# Patient Record
Sex: MALE
[De-identification: names, ages, dates, MRNs, and addresses within clinical notes are randomized per-mention and may not be internally consistent; named-entity substitution may affect disease eponyms.]

---

## 2024-04-23 ENCOUNTER — TRANSCRIBE ORDERS (OUTPATIENT)
Dept: OTHER | Age: 39
End: 2024-04-23

## 2024-04-23 DIAGNOSIS — R49.9 UNSPECIFIED VOICE AND RESONANCE DISORDER: Primary | ICD-10-CM

## 2024-06-06 ENCOUNTER — TELEPHONE (OUTPATIENT)
Dept: OTOLARYNGOLOGY | Facility: CLINIC | Age: 39
End: 2024-06-06
Payer: COMMERCIAL

## 2024-06-06 NOTE — TELEPHONE ENCOUNTER
Left Voicemail (1st Attempt) for the patient to call back and schedule the following:    Appointment type: New SLP Voice  Provider: Mariely Landrum  Return date: next available  Specialty phone number: [phone]  Additional appointment(s) needed:   Additonal Notes: r/s 10/4 appt

## 2024-06-10 ENCOUNTER — TELEPHONE (OUTPATIENT)
Dept: OTOLARYNGOLOGY | Facility: CLINIC | Age: 39
End: 2024-06-10
Payer: COMMERCIAL

## 2024-06-10 NOTE — TELEPHONE ENCOUNTER
Patient confirmed scheduled appointment:  Date: 11/7  Time: 3:30pm  Visit type: Return SLP Voice  Provider: Marisol Manzano  Location: [facility]  Testing/imaging:   Additional notes:

## 2024-06-11 ENCOUNTER — TELEPHONE (OUTPATIENT)
Dept: OTOLARYNGOLOGY | Facility: CLINIC | Age: 39
End: 2024-06-11
Payer: COMMERCIAL

## 2024-06-12 NOTE — TELEPHONE ENCOUNTER
FUTURE VISIT INFORMATION      FUTURE VISIT INFORMATION:  Date: 10/1/24  Time: 2:00pm  [facility]  REFERRAL INFORMATION:  Referring provider:  Ana Og APRN CNP   Referring providers clinic:  [facility]  Reason for visit/diagnosis  Unspecified voice and resonance disorder     RECORDS REQUESTED FROM:       Clinic name Comments Records Status Imaging Status   [facility] Request for recs sent 6/12- no recs coming up in Care Everywhere- sent request 6/12- received most recent records are from 2018     [facility] 2/2/17-3/19/14 Care Everywhere

## 2024-07-28 ENCOUNTER — HEALTH MAINTENANCE LETTER (OUTPATIENT)
Age: 39
End: 2024-07-28

## 2024-09-09 ENCOUNTER — TELEPHONE (OUTPATIENT)
Dept: OTOLARYNGOLOGY | Facility: CLINIC | Age: 39
End: 2024-09-09
Payer: COMMERCIAL

## 2024-09-09 NOTE — TELEPHONE ENCOUNTER
Left voicemail to offer a sooner appt for New SLP Voice with Penny Carvalho. First come first serve, please do not doublebook slot if already filled if patient calls back. At time of call had an opening on 9/12 @ 9am.   Kathryn Landis on 9/9/2024 at 9:32 AM

## 2024-09-09 NOTE — TELEPHONE ENCOUNTER
FUTURE VISIT INFORMATION      FUTURE VISIT INFORMATION:  Date: 9/12/24  Time: 9:00am  [place] [facility] [place]  REFERRAL INFORMATION:  Referring provider:  Ana Og APRN CNP   Referring providers clinic:  [facility]  Reason for visit/diagnosis  Unspecified voice and resonance disorder      RECORDS REQUESTED FROM:         Clinic name Comments Records Status Imaging Status   [facility] Request for recs sent 6/12- no recs coming up in Care Everywhere- sent request 6/12- received most recent records are from 2018       [facility] 2/2/17-3/19/14 Care Everywhere

## 2024-09-12 ENCOUNTER — PRE VISIT (OUTPATIENT)
Dept: OTOLARYNGOLOGY | Facility: CLINIC | Age: 39
End: 2024-09-12

## 2024-09-12 ENCOUNTER — OFFICE VISIT (OUTPATIENT)
Dept: OTOLARYNGOLOGY | Facility: CLINIC | Age: 39
End: 2024-09-12
Attending: NURSE PRACTITIONER
Payer: COMMERCIAL

## 2024-09-12 DIAGNOSIS — J38.7 LARYNGEAL HYPERFUNCTION: ICD-10-CM

## 2024-09-12 DIAGNOSIS — R49.9 UNSPECIFIED VOICE AND RESONANCE DISORDER: ICD-10-CM

## 2024-09-12 DIAGNOSIS — R09.A2 GLOBUS SENSATION: ICD-10-CM

## 2024-09-12 DIAGNOSIS — R49.0 DYSPHONIA: Primary | ICD-10-CM

## 2024-09-12 PROCEDURE — 92520 LARYNGEAL FUNCTION STUDIES: CPT | Mod: 52 | Performed by: SPEECH-LANGUAGE PATHOLOGIST

## 2024-09-12 PROCEDURE — 92524 BEHAVRAL QUALIT ANALYS VOICE: CPT | Mod: GN | Performed by: SPEECH-LANGUAGE PATHOLOGIST

## 2024-09-12 PROCEDURE — 31579 LARYNGOSCOPY TELESCOPIC: CPT | Performed by: SPEECH-LANGUAGE PATHOLOGIST

## 2024-09-12 NOTE — PROGRESS NOTES
[facility] VOICE [facility]  Isac Mina Jr., M.D., F.A.C.S.  Kelsey Leo M.D., M.P.H.  Irish Arguello M.D.  PEDRO Garcia, M.M. (voice), M.A., CCC-SLP  Esmer Troy MTerrenceS., CCC-SLP  Delmis Quinn, Ph.D., CCC-SLP  Naga Webber, Ph.D., CCC-SLP  LYNDSEY RodriguezS., CCC-SLP  Dylan Sanchez M.M., M.A., CCC-SLP  LISA Zacarias (voice), M.S., CCC-SLP  [facility]  INITIAL EVALUATION AND LARYNGEAL EXAMINATION REPORT    Patient: Dakota Corley  Date of Visit: 9/12/2024  Certification Dates: not needed  Referring Provider: self-referred  State of Residence: [place]  Visit Count: 1    REASON FOR REFERRAL  R49.0 (Dysphonia)/Evaluate, perform laryngeal exam, treat as appropriate    HISTORY  PATIENT INFORMATION  Dakota Corley is a 38 year old male presenting today for evaluation of dysphonia and was referred to this clinic by a friend whose wife had worked with the [facility] Voice [facility].  Salient details of his symptom history are as follows:  Chief complaint: He had previously been referred to [facility], but didn't feel like it really addressed his concerns. He feels like he doesn't have a real answer for why he's feeling strain and tightness in his voice or tools to fix it. A friend recommended that he come to the [facility]. He's had three sinus surgeries, uvulectomy, tonsillectomy, allergy shots for 5 years, and reflux treatment, but these still didn't directly address the problem.   Onset: 11-12 years ago   Course: Stable  Salient history: He has a history significant for septoplasty and turbinoplasty by Dr. Diaz in January 2015 .  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 He hasn't had any formal voice training but initially never felt any tension or strain in his  singing. He mostly sings pop, rock, jazz, blues (no opera and scream/vocal distortions). He no longer performs as much as he would like to because he doesn't trust his voice or musicality anymore. He doesn't feel like he can make his voice do what he wants it to and worries that he won't reach all of the notes in his upper range. He previously could easily switch into falsetto and effortlessly do silly voices/affectations. He now feels like his voice chokes when he tries to access the upper range. He can do some vocal health strategies and finds slight improvement, but never gets beyond what feels like a brick wall.  His speaking voice fatigues easily and feels strained/effortful. It was initially much more effortful to speak, so he developed a habit of speaking very quietly and lower which is now habitual. People have a hard time hearing him. He hesitates to speak or use his voice loudly or in loud environments because he's afraid to push or try to project. Trying to push his voice will make it fatigue even faster and might prevent him from getting through the show.   Denies pain with voice use, just lots of pressure, tension, and discomfort. It doesn't feel free to speak or sing.  He almost always feels pressure and tension along his left-side. Stress will make tension and voice quality worse.   He also plays guitar, occasionally piano  COUGH/THROAT CLEAR: Some cough, but mostly throat clearing due to drainage/mucus. He's not able to get anything out, feeling like the mucus is really gooey and thick. Some days can be worse than others, but happens frequently. Doesn't seem related to air pollution levels, foods, etc. He sleeps with a HEPA filter in his room.   SWALLOWING/GLOBUS/SENSITIVITY: Deeply regrets partial uvulectomy due to now feeling foods/liquids go into his nose. He previously felt a lump sensation all the time along the left side of his throat, which has somewhat diminished. Stress can increase the lump  sensation. He points between his left SCM and thyroid cartilage, stating it's about the size of a marble to a little bigger. He feels this internally, never having felt a lump with his finger. His partner is an NP and has also never felt a physical lump.   BREATHING: Denies.   Patient denies significant pain.     SYSTEMIC FACTORS  Hydration:  Good hydration  Stress Level: 8/10, does not have specific stress management strategies  Sleep: 6 hours/night, difficulty staying asleep and staying asleep.  Moderate MADINA with sleep apnea previously diagnosed, but no longer has issues since tonsillectomy.  Medications: testosterone, dehist supplement  Reflux: Denies overt symptoms  Post-Nasal Drip/Congestion: Chronic sinus drainage, but has had multiple sinus surgeries and uses medications.   Neck/Back/Jaw Pain and/or Tension: He has tried seeing massage therapists, who typically don't want to work in the neck area. He has not seen PT for this particular issue. Has seen PT for postural restoration and right hip. He no longer has an L4-L5 disc due to previous injury and essentially self-fusion of the vertebrae. He has more recently had issues with SI joint and tight hip flexors, having developed compensatory patterns to avoid back pain which have now caused other issues. He does have regular pain, but manages it as best he can.  OTHER PERTINENT HISTORY  Complex medical history: please also refer to  care everywhere notes      No past medical history on file.  No past surgical history on file.    OBJECTIVE FINDINGS  Patient Supplied Answers To Actual Experience Intake Voice Questionnaire       No data to display                 Patient Supplied Answers To VHI Questionnaire      9/12/2024     9:05 AM   Voice Handicap Index (VHI-10)   My voice makes it difficult for people to hear me 2   People have difficulty understanding me in a noisy room 3   My voice difficulties restrict my personal and social life.  3   I feel left out of conversations  "because of my voice 1   My voice problem causes me to lose income 4   I feel as though I have to strain to produce voice 4   The clarity of my voice is unpredictable 4   My voice problem upsets me 4   My voice makes me feel handicapped 4   People ask, \"What's wrong with your voice?\" 0   VHI-10 29        Patient Supplied Answers To CSI Questionnaire       No data to display                 Patient Supplied Answers To EAT Questionnaire       No data to display                 Patient Supplied Answers to Dyspnea Index Questionnaire:       No data to display             Speech follow up as discussed with patient:       No data to display                PERCEPTUAL EVALUATION (10669)  VOICE/ SPEECH/ NON-COMMUNICATIVE LARYNGEAL BEHAVIORS EVALUATION  Palpation of the laryngeal area shows:  firm musculature  tenderness of the thyrohyoid area  reduced thyrohoid space  L>R  bilaterally  AP palpation of the thyrohyoid area improves voice quality  AP palpation of the thyrohyoid area improves ease of inhalation  palpation did not induce cough  Breathing pattern:  shoulder and neck involvement, overall shallow breath pattern, incoordination with phonation, insufficient breath stream for duration of phonation, and is able to demonstrate abdominal breathing pattern and intercostal breathing.   Tension:  is evident in the neck and upper body  Cough/ Throat clear:  not observed today   Dakota states today is a typical voice day, with clinician observing voice quality characterized by:  Roughness: Mild to moderate  Breathiness: WNL  Strain: Mild to moderate  Habitual pitch is 98Hz and is WNL and appropriate, but sounds lower due to rough voice quality and backward focus  Loudness is mildly reduced for the setting  Maximum Phonation Time: 21 seconds  GLOBAL ASSESSMENT OF DYSPHONIA: 45/100  The GRBAS is a perceptual rating of voice change. 0 indicated no impairment, 3 indicates a severe impairment. \"C\" and \"I\" may be used to signify if " these feature was observed consistently or intermittently respectively. This is a rating based on clinical judgement of disordered voice quality.  G ( 1-2 ) General Dysphonia     R ( 1-2 ) Roughness     B ( 1 ) Breathiness     A ( 1 ) Asthenia     S ( 2 ) Strain    LARYNGEAL FUNCTION STUDIES (47023)  - 52 modifier, aerodynamic measures not collected today    /a/ mean f0 = 118.749 Hz (SD = 1.167)  /i/ mean f0 = 113.412 Hz (SD = 1.000)  [place]:     Lowest f0 = 75.907 Hz      Higest f0 = 406.994 Hz      Range = 331.088 Hz   Connected Speech     Mean f0 = 148.913 Hz (SD 50.616)     Median f0 = 139.581 Hz      Lowest f0 = 87.672 Hz      Highest f0 = 496.139 Hz      Speaking Range = 408.468 Hz         LARYNGEAL EXAMINATION (31579-with stroboscopy)  Procedure: Flexible endoscopy with chip-tip technology with stroboscopy, right nostril; topical anesthesia with 3% Lidocaine and 0.25% phenylephrine was applied.   Performed by: Dolly Zacarias (voice), M.STerrence, CCC-SLP  Verbal consent was obtained and witnessed prior to this procedure.   A time-out was performed, verifying patient, procedure, and site.     This exam shows:  Velar Function: WNL  Secretions:  mild collection of secretions in the velopharyngeal port and mild collection and banding of secretions along the glottis and superior vocal folds  Laryngeal Mucosa: slight erythema of the arytenoids, Interarytenoid tissue, and posterior cricoid region  Vocal fold mucosa:    RTVF -  very slight redness, but otherwise smooth and straight edges  LTVF -  very slight redness, but otherwise smooth and straight edges  Vocal fold function:   Vocal folds are bilaterally mobile and meet at midline, movement is brisk and symmetric, and exam is neurologically normal.  Narrow Band Imaging (NBI) demonstrated: Findings consistent with halogen light  Airway is patent as visualized below the glottis  Elongation of the vocal folds for pitch increase is inadequate  severe four-way constrictive  supraglottic hyperfunction during connected speech    The addition of stroboscopy provided the following information:  Vibratory Behavior: Present bilaterally  Periodicity: Consistently periodic  Symmetry:  good symmetry  Amplitude Right: WNL  Amplitude Left: WNL  Mucosal Wave Right: WNL  Mucosal Wave Left: WNL  Glottic closure:  on phonation glottic closure is complete   Closure Pattern: normal  Closure Plane: at glottic level  Phase Distribution: closed-phase predominant     STIMULABILITY: results of therapy probes during perceptual and laryngeal evaluation demonstrate improvement with Reduced hyperfunction with use of flow/easy onset phonation, yawn-sigh, and improved coordination of respiration and phonation.    ASSESSMENT/PLAN  IMPRESSIONS: Dakota Corley is presenting today with Globus Sensation (R09.89) and Dysphonia (R49.0) in the context of Laryngeal Hyperfunction (J38.7) and imbalance in the intrinsic and extrinsic laryngeal musculature. Perceptually, Dakota demonstrates mild-moderately rough and strained voice quality corresponding with elevated AVQI, lower than optimal habitual frequency, non optimal respiratory mechanics, significant L>R perilaryngeal tension, and visible tension of the neck/upper body. Laryngoscopy with stroboscopy was completed and reveals good symmetry and overall normal mucosal wave and amplitude bilaterally. 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 Based on today's evaluation, Dakota would benefit from a course of speech therapy to improve laryngeal efficiency, reduce laryngeal irritation (mucus), improved respiratory mechanics and coordination with phonation, manual release strategies for perilaryngeal hyperfunction, and improve voice quality and balance of intrinsic/extrinsic laryngeal musculature. We will plan on 2 in person sessions to start, combined with virtual sessions. He agreed to  drive [place] the [place] border for the virtual sessions    RECOMMENDATIONS:  Medically necessary speech therapy is warranted to improve voice quality and promote reduced discomfort, effort and fatigue.  Potentially a PT referral in combination with SLP  He demonstrates a Good prognosis for improvement given adherence to therapeutic recommendations.  Positive indicators: positive response to therapy probes diagnosis is known to respond to treatment  Negative indicators: None/Unremarkable  Research: This patient may be willing to be contacted about participation in research. Will ask at first therapy session. May be eligible for Muscle Tension Dysphonia study.    DURATION/FREQUENCY: Six bi-weekly, one-hour sessions, a mix of virtual and in person, with two monthly one-hour speech therapy follow-up sessions    Goals:  Patient goal:    To understand the problem and fix it as much as possible    Short-term goal(s): Within the first 4 sessions, Dakota will:  -- utilize vocal hygiene strategies in order to minimize laryngeal irritation and facilitate improved therapeutic outcomes with 90% accy.  -- demonstrate silent inhalation and abdominal breathing pattern in order to optimize breathing mechanics with 90% accy and min cues.  -- demonstrate daryl-laryngeal release and laryngeal massage techniques with >80% accy  -- coordinate appropriate air flow levels with forward resonance during phonation in order to minimize laryngeal compensation and effort with 90% accy.    Long-term goal(s): In 3 months, Dakota will:  -- report a 90% resolution of voice and throat symptoms during a week of performing typical personal, social, and professional activities.    This treatment plan was developed with the patient who agreed with the recommendations.    ICD-10 codes:  Globus Sensation (R09.89), Laryngeal Hyperfunction (J38.7), and Dysphonia (R49.0)    TOTAL SERVICE TIME: 120 minutes  EVALUATION OF VOICE AND RESONANCE (04122), LARYNGEAL FUNCTION  STUDIES (34597), ENDOSCOPIC LARYNGEAL EXAMINATION WITH STROBOSCOPY (64166)    Dolly Zacarias (voice), M.S., CCC-SLP  Speech-Language Pathologist  [facility]  [phone]  [email or web address]  Pronouns: she/her/hers      *this report was created in part through the use of computerized dictation software, and though reviewed following completion, some typographic errors may persist.  If there is confusion regarding any of this notes contents, please contact me for clarification

## 2024-09-17 ENCOUNTER — VIRTUAL VISIT (OUTPATIENT)
Dept: OTOLARYNGOLOGY | Facility: CLINIC | Age: 39
End: 2024-09-17
Payer: COMMERCIAL

## 2024-09-17 DIAGNOSIS — J38.7 LARYNGEAL HYPERFUNCTION: ICD-10-CM

## 2024-09-17 DIAGNOSIS — R49.0 DYSPHONIA: Primary | ICD-10-CM

## 2024-09-17 DIAGNOSIS — R09.A2 GLOBUS SENSATION: ICD-10-CM

## 2024-09-17 PROCEDURE — 92507 TX SP LANG VOICE COMM INDIV: CPT | Mod: GN | Performed by: SPEECH-LANGUAGE PATHOLOGIST

## 2024-09-17 NOTE — LETTER
9/17/2024       RE: Dakota Corley  [address] [facility] [address]     Dear Colleague,    Thank you for referring your patient, Dakota Corley, to the [facility] [place] [facility]. Please see a copy of my visit note below.    Dakota Corley is a 38 year old male who is being evaluated via a billable video visit.      Dakota has been notified and verbally consented to the following:   This video visit will be conducted between you and your provider.  Patient has opted to conduct today's video visit vs an in-person appointment.   Video visits are billed at different rates depending on your insurance coverage. Please reach out to your insurance provider with any questions.   If during the course of the call the provider feels the appointment is not appropriate, you will not be charged for this service.  Provider has received verbal consent for billable virtual visit from the patient? Yes  Will anyone else be joining your video visit? No    Call initiated at: 0900   Type of Visit Platform Used: Take[employer] Video  Location of provider: Home  Location of patient: [facility]IC  PROGRESS REPORT (CPT 72946)    Patient: Dakota Corley  Date of Service: 9/17/2024  Date of Last Service: 9/12/24  Number of Visits: 2  Certification Dates: not needed  Referring Physician:  self-referred    Impressions from evaluation on 9/12/24 by Dolly Zacarias (voice) MTerrenceSTerrence, CCC-SLP:  Dakota Corley is presenting today with Globus Sensation (R09.89) and Dysphonia (R49.0) in the context of Laryngeal Hyperfunction (J38.7) and imbalance in the intrinsic and extrinsic laryngeal musculature. Perceptually, Dakota demonstrates mild-moderately rough and strained voice quality corresponding with elevated AVQI, lower than optimal habitual frequency, non optimal respiratory mechanics, significant L>R perilaryngeal tension, and visible tension of the neck/upper body.  Laryngoscopy with stroboscopy was completed and reveals good symmetry and overall normal mucosal wave and amplitude bilaterally. 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 Based on today's evaluation, Dakota would benefit from a course of speech therapy to improve laryngeal efficiency, reduce laryngeal irritation (mucus), improved respiratory mechanics and coordination with phonation, manual release strategies for perilaryngeal hyperfunction, and improve voice quality and balance of intrinsic/extrinsic laryngeal musculature. We will plan on 2 in person sessions to start, combined with virtual sessions. He agreed to drive [place] the [place] border for the virtual sessions.     SUBJECTIVE:  Since Dakota's last session, he reports the following:   Overall he has not had any change in his symptoms, which is expected, as he has only participated in an evaluation thus far and no therapeutic suggestions were made at the time.  He notes that his baseline for normal throat sensation is skewed because he's had these issues for so long.    OBJECTIVE:  Patient Specific Goal Metrics:      9/17/2024     9:00 AM   Dysponia SLP Goals   How how severe is your [Throat Discomfort - or use patient specific description], if 0 is no [discomfort] at all and 10 is the worst [discomfort]? 6   How much does your throat discomfort bother you?     Quite a bit     THERAPEUTIC ACTIVITIES  Today Dakota participated in the following therapeutic activities:  Counseling and Education:  Asked questions about the nature of his symptoms, and I answered all of these thoroughly.  Therapy protocol and rationale, including plan for today's session and future sessions  Education of laryngeal anatomy and physiology    I provided Dakota with explanation and skilled instruction of:  Education and exercises to promote optimal respiratory mechanics were provided:  Explanation of the  "anatomy and physiology of respiration for phonation; he found this to be helpful  He demonstrated excessive upper thoracic engagement during inhalation  Difficulty allowing abdominal relaxation for inhalation, and audible inhalation prior to instruction  Targeted practice of optimal breathing mechanics with patient positioned in sitting and a forward leaning position  With clinician support, patient was able to demonstrate improved abdominal relaxation and engagement on inhalation  Optimal exhalation using inward engagement of the abdominal wall with no corresponding collapse of the upper chest cavity was trained using the sustained \"sh\" task  Discussion of releasing the posterior ribcage in order to inhale fully without constriction. We compared this to his habitual posture related to weight-lifting and how the two stances differ.  Dakota demonstrated good accuracy with moderate clinician support, including verbal explanation and visual demonstration to facilitate awareness of low respiratory engagement.   Exercises to coordinate phonation with optimal flowing airstream and reduce phonatory impact.   Semi-occluded vocal tract exercises with ballooned cheeks and fist  were most facilitating  He progressed through the warm-up level of phonatory complexity  Instructed to use as a voice warm up, cool down, coordination of breath flow with phonation, and for tissue mobilization.  Specific modifications instructed included focus on feeling his throat stretch and release  Patient demonstrated good learning, but will need practice and additional reinforcement    Instruction of Home Practice:  I instructed Dakota in the concepts of an optimal practice regimen, including use of an interval schedule with brief periods of practice frequently throughout each day, and concepts of volitional practice to facilitate motor learning.  I emphasized the therapeutic rational and provided an After Visit Summary through [facility] to reinforce " today's therapeutic activities and facilitate home practice.    ASSESSMENT/PLAN  Progress toward long-term goals:  Minimal at this point, as this is first session, but good learning today    Impressions:  IMPRESSIONS: Globus Sensation (R09.89) and Dysphonia (R49.0) in the context of Laryngeal Hyperfunction (J38.7)   Dakota had a productive session of therapy today, working on respiratory mechanics and introduction to SOVT. He will continue to work on his exercises on a daily basis, and work on incorporating the techniques into his daily activities. Speech therapy continues to be medically necessary for Dakota to participate fully and engage in activities of daily living.     Plan:   I will see Dakota in 2 weeks and will plan to check on respiratory mechanics and fist balloon. Instruct laryngeal release and neck stretches.   For practice goals, see AVS.     TOTAL SERVICE TIME: 60 minutes  TREATMENT (95103)    Dolly Zacarias (voice), M.S., CCC-SLP  Speech-Language Pathologist  [facility]  [phone]  [email or web address]  Pronouns: she/her/hers      *this report was created in part through the use of computerized dictation software, and though reviewed following completion, some typographic errors may persist.  If there is confusion regarding any of this notes contents, please contact me for clarification    OBJECTIVE FINDINGS  PATIENT REPORTED MEASURES  Patient Supplied Answers To Last 2 VHI Questionnaires      9/12/2024     9:05 AM 9/17/2024     8:39 AM   Voice Handicap Index (VHI-10)   My voice makes it difficult for people to hear me 2 2   People have difficulty understanding me in a noisy room 3 3   My voice difficulties restrict my personal and social life.  3 3   I feel left out of conversations because of my voice 1 2   My voice problem causes me to lose income 4 2   I feel as though I have to strain to produce voice 4 3   The clarity of my voice is unpredictable 4 3   My voice problem upsets  "me 4 3   My voice makes me feel handicapped 4 3   People ask, \"What's wrong with your voice?\" 0 2   VHI-10 29 26          Patient Supplied Answers To Last 2 CSI Questionnaires       No data to display                   Patient Supplied Answers To Last 2 EAT Questionnaires       No data to display                  Patient Supplied Answers to Dyspnea Index Questionnaire:       No data to display                   Again, thank you for allowing me to participate in the care of your patient.      Sincerely,    Penny Carvalho, SLP    "

## 2024-09-17 NOTE — PROGRESS NOTES
Dakota Corley is a 38 year old male who is being evaluated via a billable video visit.      Dakota has been notified and verbally consented to the following:   This video visit will be conducted between you and your provider.  Patient has opted to conduct today's video visit vs an in-person appointment.   Video visits are billed at different rates depending on your insurance coverage. Please reach out to your insurance provider with any questions.   If during the course of the call the provider feels the appointment is not appropriate, you will not be charged for this service.  Provider has received verbal consent for billable virtual visit from the patient? Yes  Will anyone else be joining your video visit? No    Call initiated at: 0900   Type of Visit Platform Used: [employer] Video  Location of provider: Home  Location of patient: [facility] VOICE CLINIC  PROGRESS REPORT (CPT 41805)    Patient: Dakota Corley  Date of Service: 9/17/2024  Date of Last Service: 9/12/24  Number of Visits: 2  Certification Dates: not needed  Referring Physician:  self-referred    Impressions from evaluation on 9/12/24 by Dolly Zacarias (voice), M.S., CCC-SLP:  Dakota Corley is presenting today with Globus Sensation (R09.89) and Dysphonia (R49.0) in the context of Laryngeal Hyperfunction (J38.7) and imbalance in the intrinsic and extrinsic laryngeal musculature. Perceptually, Dakota demonstrates mild-moderately rough and strained voice quality corresponding with elevated AVQI, lower than optimal habitual frequency, non optimal respiratory mechanics, significant L>R perilaryngeal tension, and visible tension of the neck/upper body. Laryngoscopy with stroboscopy was completed and reveals good symmetry and overall normal mucosal wave and amplitude bilaterally. 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 Based on today's evaluation, Dakota would benefit  from a course of speech therapy to improve laryngeal efficiency, reduce laryngeal irritation (mucus), improved respiratory mechanics and coordination with phonation, manual release strategies for perilaryngeal hyperfunction, and improve voice quality and balance of intrinsic/extrinsic laryngeal musculature. We will plan on 2 in person sessions to start, combined with virtual sessions. He agreed to drive [place] the [place] border for the virtual sessions.     SUBJECTIVE:  Since Dakota's last session, he reports the following:   Overall he has not had any change in his symptoms, which is expected, as he has only participated in an evaluation thus far and no therapeutic suggestions were made at the time.  He notes that his baseline for normal throat sensation is skewed because he's had these issues for so long.    OBJECTIVE:  Patient Specific Goal Metrics:      9/17/2024     9:00 AM   Dysponia SLP Goals   How how severe is your [Throat Discomfort - or use patient specific description], if 0 is no [discomfort] at all and 10 is the worst [discomfort]? 6   How much does your throat discomfort bother you?     Quite a bit     THERAPEUTIC ACTIVITIES  Today Dakota participated in the following therapeutic activities:  Counseling and Education:  Asked questions about the nature of his symptoms, and I answered all of these thoroughly.  Therapy protocol and rationale, including plan for today's session and future sessions  Education of laryngeal anatomy and physiology    I provided Dakota with explanation and skilled instruction of:  Education and exercises to promote optimal respiratory mechanics were provided:  Explanation of the anatomy and physiology of respiration for phonation; he found this to be helpful  He demonstrated excessive upper thoracic engagement during inhalation  Difficulty allowing abdominal relaxation for inhalation, and audible inhalation prior to instruction  Targeted practice of optimal breathing mechanics with  "patient positioned in sitting and a forward leaning position  With clinician support, patient was able to demonstrate improved abdominal relaxation and engagement on inhalation  Optimal exhalation using inward engagement of the abdominal wall with no corresponding collapse of the upper chest cavity was trained using the sustained \"sh\" task  Discussion of releasing the posterior ribcage in order to inhale fully without constriction. We compared this to his habitual posture related to weight-lifting and how the two stances differ.  Dakota demonstrated good accuracy with moderate clinician support, including verbal explanation and visual demonstration to facilitate awareness of low respiratory engagement.   Exercises to coordinate phonation with optimal flowing airstream and reduce phonatory impact.   Semi-occluded vocal tract exercises with ballooned cheeks and fist  were most facilitating  He progressed through the warm-up level of phonatory complexity  Instructed to use as a voice warm up, cool down, coordination of breath flow with phonation, and for tissue mobilization.  Specific modifications instructed included focus on feeling his throat stretch and release  Patient demonstrated good learning, but will need practice and additional reinforcement    Instruction of Home Practice:  I instructed Dakota in the concepts of an optimal practice regimen, including use of an interval schedule with brief periods of practice frequently throughout each day, and concepts of volitional practice to facilitate motor learning.  I emphasized the therapeutic rational and provided an After Visit Summary through [employer] to reinforce today's therapeutic activities and facilitate home practice.    ASSESSMENT/PLAN  Progress toward long-term goals:  Minimal at this point, as this is first session, but good learning today    Impressions:  IMPRESSIONS: Globus Sensation (R09.89) and Dysphonia (R49.0) in the context of Laryngeal Hyperfunction " "(J38.7)   Dakota had a productive session of therapy today, working on respiratory mechanics and introduction to SOVT. He will continue to work on his exercises on a daily basis, and work on incorporating the techniques into his daily activities. Speech therapy continues to be medically necessary for Dakota to participate fully and engage in activities of daily living.     Plan:   I will see Dakota in 2 weeks and will plan to check on respiratory mechanics and fist balloon. Instruct laryngeal release and neck stretches.   For practice goals, see AVS.     TOTAL SERVICE TIME: 60 minutes  TREATMENT (28849)    Dolly Zacarias (voice), M.S., CCC-SLP  Speech-Language Pathologist  [facility]  [phone]  [email or web address]  Pronouns: she/her/hers      *this report was created in part through the use of computerized dictation software, and though reviewed following completion, some typographic errors may persist.  If there is confusion regarding any of this notes contents, please contact me for clarification    OBJECTIVE FINDINGS  PATIENT REPORTED MEASURES  Patient Supplied Answers To Last 2 VHI Questionnaires      9/12/2024     9:05 AM 9/17/2024     8:39 AM   Voice Handicap Index (VHI-10)   My voice makes it difficult for people to hear me 2 2   People have difficulty understanding me in a noisy room 3 3   My voice difficulties restrict my personal and social life.  3 3   I feel left out of conversations because of my voice 1 2   My voice problem causes me to lose income 4 2   I feel as though I have to strain to produce voice 4 3   The clarity of my voice is unpredictable 4 3   My voice problem upsets me 4 3   My voice makes me feel handicapped 4 3   People ask, \"What's wrong with your voice?\" 0 2   VHI-10 29 26          Patient Supplied Answers To Last 2 CSI Questionnaires       No data to display                   Patient Supplied Answers To Last 2 EAT Questionnaires       No data to display          "         Patient Supplied Answers to Dyspnea Index Questionnaire:       No data to display

## 2024-09-17 NOTE — PATIENT INSTRUCTIONS
"Hello!    It was a pleasure to see you today. Please complete the exercises below and remember, a few minutes of practice many times throughout the day is more important than one large practice session. If you have any questions about your strategies, feel free to contact me at [email or web address] or via [email or web address]. Please call [phone] for scheduling alterations or to be seen sooner in case of cancellations.     Home Exercise Program:  Your goal is: Progress, not perfection.     BELLY BREATHING  Stretch: (each morning)  Stretch your arms up and take two slow, deep breaths, feeling your rib cage lift and stretch.    Now lean toward each side, taking 2 slow, deep breaths on each side, feeling your ribcage lift and expand along the sides.   Now leaning back, arching your back to feel a stretch along the front of your ribs, and take 2 more slow, deep breaths. Really let the abdomen expand fully.  Lastly, lean forward so that your head, neck, and arms are completely slack toward the ground. You can also give yourself a tight bear hug across your front.  Hold for 30 seconds and allow your back and neck to stretch and take several slow, deep breaths. You should be able to feel a bit of expansion between your spine and shoulder blades as you inhale, then feel the shoulder blades pull back inward on exhalation.      Practice: (5 breaths per hour or 10 breaths at each meal)  Sit hinged forward with your elbows on your knees. OR Sit/stand normally (give yourself permission to add 2% of a sit-up, gorilla posture)  Decide whether you prefer to place your hands on either side of your belly and back or low ribs, or one of each.  If someone is nearby to help, they can place their hands on the sides of your back and ribs.   Slowly breathe in and feel your belly and back expand, like filling a balloon, pushing out against your waistband  Slowly breathe out (on \"Shhhhhh\") and feel your belly and back crunch inward, like " "zipping tight pants  Repeat slowly and take breaks if you get dizzy    HELPFUL HINTS:  -- Tie a string or piece of elastic around your low ribs when you get dressed. You'll be able to feel yourself expand against this as you breathe all day.   -- Some people find it easiest to practice while laying on their back or in a recliner, hands on their belly.  -- You can also roll onto your stomach, resting your arms above your head, in order to feel more back expansion.   -- You can wear a weight belt when exercising and know that when you're exerting yourself, you don't expand as much in your abdomen. You'll need to keep your lower abs contracted for stability, but you can shift the focus to your solar plexus and ribs. \"Upper belly\"  -- I would not plan to wear something tight like a weight belt throughout the day, unless you're actively exercising. Something small like the elastic band for a heart-rate monitor. Remember that simply moving these joints is challenge enough to start with, and if you want more resistance, you can add a pause after the inhalation to strengthen the inspiratory muscles with controlled abdominal contraction.   ____________________________________________________    VOICE EXERCISES (AM & PM, more if needed)  -- Inhale SILENTLY and feel your belly fill with air  -- Slowly exhale as your belly contracts  -- Blow through a tight fist, making your cheeks and throat balloon  -- \"Whoooooo\"  1. 3-5x silently with just air for 5-7 seconds  2. 3-5x foghorn on single pitch for 5-7 seconds  3. 3-5x sliding lower for 5-7 seconds (\"ding dong\")  4. 3-5x sliding higher for 5-7 seconds (\"here comes the bride\")(think a little breathier when you go high)  5. 3-5x sliding up and down like sirens  8. BONUS: Easy songs/melodies with slurred phrases and no loss of balloon  -- Double-check that your inhale is a SILENT yawn and your CHEEKS ARE BALLOONED      Thank you and have a great day!  TITUS EvansMus. " (voice), M.S., CCC-SLP  Speech-Language Pathologist  [facility]  [phone]  [email or web address]  Pronouns: she/her/hers

## 2024-10-01 ENCOUNTER — PRE VISIT (OUTPATIENT)
Dept: OTOLARYNGOLOGY | Facility: CLINIC | Age: 39
End: 2024-10-01

## 2024-10-02 ENCOUNTER — VIRTUAL VISIT (OUTPATIENT)
Dept: OTOLARYNGOLOGY | Facility: CLINIC | Age: 39
End: 2024-10-02
Payer: COMMERCIAL

## 2024-10-02 DIAGNOSIS — R49.0 DYSPHONIA: Primary | ICD-10-CM

## 2024-10-02 DIAGNOSIS — R09.A2 GLOBUS SENSATION: ICD-10-CM

## 2024-10-02 DIAGNOSIS — J38.7 LARYNGEAL HYPERFUNCTION: ICD-10-CM

## 2024-10-02 PROCEDURE — 92507 TX SP LANG VOICE COMM INDIV: CPT | Mod: GN | Performed by: SPEECH-LANGUAGE PATHOLOGIST

## 2024-10-02 NOTE — Clinical Note
10/2/2024       RE: Dakota Corley  [address] [facility] [address]     Dear Colleague,    Thank you for referring your patient, Dakota Corley, to the [facility] VOICE CLIN[facility] [place] at [facility]. Please see a copy of my visit note below.    Dakota Corley is a 38 year old male who is being evaluated via a billable video visit.      Dakota has been notified and verbally consented to the following:   This video visit will be conducted between you and your provider.  Patient has opted to conduct today's video visit vs an in-person appointment.   Video visits are billed at different rates depending on your insurance coverage. Please reach out to your insurance provider with any questions.   If during the course of the call the provider feels the appointment is not appropriate, you will not be charged for this service.  Provider has received verbal consent for billable virtual visit from the patient? Yes  Will anyone else be joining your video visit? No    Call initiated at: 1200   Type of Visit Platform Used: [employer] Video  Location of provider: Home  Location of patient: [facility] VOICE CLINIC  PROGRESS REPORT (CPT 27500)    Patient: Dakota Corley  Date of Service: 10/2/2024  Date of Last Service: 9/17/24  Number of Visits: 3  Certification Dates: not needed  Referring Physician: self-referred    Impressions from evaluation on 9/12/24 by Dolly Zacarias (voice) MTerrenceSTerrence, CCC-SLP:  Dakota Corley is presenting today with Globus Sensation (R09.89) and Dysphonia (R49.0) in the context of Laryngeal Hyperfunction (J38.7) and imbalance in the intrinsic and extrinsic laryngeal musculature. Perceptually, Dakota demonstrates mild-moderately rough and strained voice quality corresponding with elevated AVQI, lower than optimal habitual frequency, non optimal respiratory mechanics, significant L>R perilaryngeal tension, and visible tension of the neck/upper body.  Laryngoscopy with stroboscopy was completed and reveals good symmetry and overall normal mucosal wave and amplitude bilaterally. 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 Based on today's evaluation, Dakota would benefit from a course of speech therapy to improve laryngeal efficiency, reduce laryngeal irritation (mucus), improved respiratory mechanics and coordination with phonation, manual release strategies for perilaryngeal hyperfunction, and improve voice quality and balance of intrinsic/extrinsic laryngeal musculature. We will plan on 2 in person sessions to start, combined with virtual sessions. He [place] drive [place] the [place] border for the virtual sessions.       SUBJECTIVE:  Since Dakota's last session, he reports the following:   ***    OBJECTIVE:  Patient Specific Goal Metrics:      9/17/2024     9:00 AM   Dysponia SLP Goals   How how severe is your [Throat Discomfort - or use patient specific description], if 0 is no [discomfort] at all and 10 is the worst [discomfort]? 6   How much does your throat discomfort bother you?     Quite a bit       THERAPEUTIC ACTIVITIES  Today Dakota participated in the following therapeutic activities:  Counseling and Education:  Asked *** questions about the nature of his symptoms, and I answered all of these thoroughly.    I provided Dakota with explanation and skilled instruction of:  ***    Instruction of Home Practice:  A revised regimen for home practice was instructed.  I provided an AVS of important notes of today's therapeutic activities to facilitate practice.    ASSESSMENT/PLAN  Progress toward long-term goals:  {[facility] Treatment Progress:656235}    Impressions:  IMPRESSIONS: Globus Sensation (R09.89) and Dysphonia (R49.0) in the context of Laryngeal Hyperfunction (J38.7)    Dakota had a productive session of therapy today, working on ***.  He will continue to work on his exercises  "on a daily basis, and work on incorporating the techniques into his daily activities. Speech therapy continues to be medically necessary for Dakota to participate fully and engage in activities of daily living.     Plan:   I will see Dakota in *** weeks and will plan to ***. check on respiratory mechanics and fist balloon. Instruct laryngeal release and neck stretches.     For home practice goals, see AVS.     TOTAL SERVICE TIME: ***60 minutes  TREATMENT (02918)    Dolly Zacarias (voice), M.S., CCC-SLP  Speech-Language Pathologist  [facility]  [phone]  [email or web address]  Pronouns: she/her/hers      *this report was created in part through the use of computerized dictation software, and though reviewed following completion, some typographic errors may persist.  If there is confusion regarding any of this notes contents, please contact me for clarification    OBJECTIVE FINDINGS  PATIENT REPORTED MEASURES  Patient Supplied Answers To Last 2 VHI Questionnaires      9/12/2024     9:05 AM 9/17/2024     8:39 AM   Voice Handicap Index (VHI-10)   My voice makes it difficult for people to hear me 2 2   People have difficulty understanding me in a noisy room 3 3   My voice difficulties restrict my personal and social life.  3 3   I feel left out of conversations because of my voice 1 2   My voice problem causes me to lose income 4 2   I feel as though I have to strain to produce voice 4 3   The clarity of my voice is unpredictable 4 3   My voice problem upsets me 4 3   My voice makes me feel handicapped 4 3   People ask, \"What's wrong with your voice?\" 0 2   VHI-10 29 26          Patient Supplied Answers To Last 2 CSI Questionnaires       No data to display                   Patient Supplied Answers To Last 2 EAT Questionnaires       No data to display                  Patient Supplied Answers to Dyspnea Index Questionnaire:       No data to display                   Again, thank you for allowing me to " participate in the care of your patient.      Sincerely,    Penny Carvalho, SLP

## 2024-10-02 NOTE — PATIENT INSTRUCTIONS
"Hello!     It was a pleasure to see you today. Please complete the exercises below and remember, a few minutes of practice many times throughout the day is more important than one large practice session. If you have any questions about your strategies, feel free to contact me at [email or web address] or via [employer]. Please call [phone] for scheduling alterations or to be seen sooner in case of cancellations.      Home Exercise Program:  Your goal is: Progress, not perfection.     LARYNGEAL RELEASE  Learning Tasks (only as needed)  1. Place your finger tips along the center of your throat/Sridhar's apple  2. Swallow and notice your voice box move up and back down  3. Compare a yawn, high-pitch EE (voice box goes up slightly) with a silent, yawny sigh on \"Huh\" like the word \"hug\" (voice box goes down slightly), gentle baby gorilla \"huh huh huh huh huh\", donkey noise \"ee uh ee uh ee uh\"     EXERCISES (5 breath/hour)   1. Drop your mouth OPEN like the word \"bleh\" with your tongue relaxed forward and flat.   2. Slowly inhale with a SILENT, yawny breath, feeling cool and quiet air flow in across your tongue and far down the back of your throat.   3. See if you sridhar's apple drops ever so slightly as you inhale with your SILENT uh feeling.   4. Repeat slowly 5 times in a row (slowly so you don't get dizzy)   -- Please DO NOT use your tongue to push down!    ADDING VOICE:   Start by tilting your head slightly back as if you're sleeping in a car, letting your jaw and tongue go totally slack (imagine a little drool coming out the side). Use your hands at the base of your neck to gently add a downward pull. Or, you could lightly rest one hand along your chin to keep your jaw relaxed.     1. Now inhale with your OPEN, SILENT yawn  2. Add a voiced sigh on \"Huhhhh\" as you exhale through the same open mouth and open throat.   -- Use a mirror if your mouth wants to close on the sigh  -- Add a real yawn to the sigh if you " "need help keeping your throat open and relaxed.    -- Place your hands on your chest and feel deep reverbation of your voice    -- Target the same level of ease and flow as your inhale- no more than 2 mph   -- Go slower if you get dizzy, with breaks between each vowel  3. Now do the following sighs 2x each: Huhhhh, Hooooo, Hohhh, Haaahhh, Heeeyyyy, Heeee, Hiiiiii, Hoooowww   -- Keep the \"UH\" shape in your throat, letting the tongue and lips make the vowel.   -- Your jaw is NOT invited.  Keep monitoring your gas pedal- no \"faster\" than 2 mph- gentle and floaty (no pushing!)  ____________________________________________________     BELLY BREATHING  Stretch: (each morning)  Stretch your arms up and take two slow, deep breaths, feeling your rib cage lift and stretch.    Now lean toward each side, taking 2 slow, deep breaths on each side, feeling your ribcage lift and expand along the sides.   Now leaning back, arching your back to feel a stretch along the front of your ribs, and take 2 more slow, deep breaths. Really let the abdomen expand fully.  Lastly, lean forward so that your head, neck, and arms are completely slack toward the ground. You can also give yourself a tight bear hug across your front.  Hold for 30 seconds and allow your back and neck to stretch and take several slow, deep breaths. You should be able to feel a bit of expansion between your spine and shoulder blades as you inhale, then feel the shoulder blades pull back inward on exhalation.       Practice: (5 breaths per hour or 10 breaths at each meal)  Sit hinged forward with your elbows on your knees. OR Sit/stand normally (give yourself permission to add 2% of a sit-up, gorilla posture)  Decide whether you prefer to place your hands on either side of your belly and back or low ribs, or one of each.  If someone is nearby to help, they can place their hands on the sides of your back and ribs.   Slowly breathe in and feel your belly and back expand, " "like filling a balloon, pushing out against your waistband  Slowly breathe out (on \"Shhhhhh\") and feel your belly and back crunch inward, like zipping tight pants  Repeat slowly and take breaks if you get dizzy     HELPFUL HINTS:  -- Tie a string or piece of elastic around your low ribs when you get dressed. You'll be able to feel yourself expand against this as you breathe all day.   -- Some people find it easiest to practice while laying on their back or in a recliner, hands on their belly.  -- You can also roll onto your stomach, resting your arms above your head, in order to feel more back expansion.   -- You can wear a weight belt when exercising and know that when you're exerting yourself, you don't expand as much in your abdomen. You'll need to keep your lower abs contracted for stability, but you can shift the focus to your solar plexus and ribs. \"Upper belly\"  -- I would not plan to wear something tight like a weight belt throughout the day, unless you're actively exercising. Something small like the elastic band for a heart-rate monitor. Remember that simply moving these joints is challenge enough to start with, and if you want more resistance, you can add a pause after the inhalation to strengthen the inspiratory muscles with controlled abdominal contraction.   ____________________________________________________     VOICE EXERCISES (AM & PM, more if needed)  -- Inhale SILENTLY with your LOW UH release and feel your belly fill with air  -- Slowly exhale as your belly contracts  -- Blow through a tight fist, making your cheeks and throat balloon  -- \"Whoooooo\" with UH in your throat.   1. 3-5x silently with just air for 5-7 seconds  2. 3-5x foghorn on single pitch for 5-7 seconds  3. 3-5x sliding lower for 5-7 seconds (\"ding dong\")  4. 3-5x sliding higher for 5-7 seconds (\"here comes the bride\")(think a little breathier when you go high)  5. 3-5x sliding up and down like sirens  8. BONUS: Easy " "songs/melodies with slurred phrases and no loss of balloon  -- Double-check that your inhale is a SILENT yawn and your CHEEKS ARE BALLOONED     ____________________________________________________    Neck Muscle Release:   (Do this first thing in the morning, and any time your neck muscles start to feel strained or tired or sore)    1. Front of the neck: Tilt your head back, letting your chin stretch toward the ceiling. Avoid clenching your teeth, but keep your mouth closed.   -- If you want a bit of extra stretch, you may jut your chin forward slightly.   -- For some additional stretch, place your hands flat at the base of your neck- stick like a tree frog and add a downward pull.   -- You can also imagine a string attached to the corner of your jaw, lifting from one side or the other, depending on what feels best to you.   -- If you want to direct the stretch a little more specifically, you can use two finger tips to apply opposite direction to \"tree frog\" stretch. Avoid the carotid artery- if you feel a strong pulse- move your fingers!    2. Front/Side of neck : Tilt your left ear toward your left shoulder, then stick your right arm behind the small of your back. Hold for 30-60 seconds.  -- You can then keep your head tilted and rotate your chin toward where your right ear was in space. You can play with the particular angle of your chin to find the best stretch. You can finger massage with your left hand. Hold for 30-60 seconds.  -- Then keep your head tilted, but rotate the other direction to look down at your left arm pit. If you'd like a little extra stretch, you can rest the weight of your left hand on the back of your skull (do not pull!). Hold for 30-60 seconds.    -- Don't forget to switch sides and do both rotation directions.    3. Sternocleidomastoid Massage: To find this muscle, turn your head to the side and feel the muscle sticking out from the bump behind your ear down to the clavicle and sternum. " Then look straight forward and use the flat parts of your thumb and pointer to gently pinch and knead/massage your SCM.   -- Add some gentle voicing while monitoring the SCM and keeping it relaxed.   -- Only do one side at a time! If you feel a strong pulse, move your fingers!    4. Back of the neck: Use one hand at a time to gently grasp the back of your neck like a puppy being picked up. Place your other hand flat at the base of your neck, stickling like a tree frog with a downward drag.   --Pinch and pull, adding a directional pull in whatever direction feels best.   -- Switch hands and repeat.   Sit up tall, then slightly tuck your chin as if looking at your top button. You should feel a slight along the base of your skull/neck.   -- Lace your fingers together and rest the weight of your hands/arm on the back of your head. Don't pull, but just use the weight to add some extra stretch. You should feel the stretch all the way down your spine. Imagine sending your air to the tight areas as you inhale, then feel the tightness release just a little more as you exhale.      For your back/shoulders, some people find it very helpful to put a tennis ball in a long sock and tie a knot at the end to keep the ball there. You can then easily hold the knee end of the sock in your hand and toss the ball over your shoulder, leaning up against a door or wall to massage your back.   -- This can also be very nice with two smaller balls (racketballs) in a sock. Lay on the floor and place the two balls along the base of your skull. Nodding and side-to-side can massage really nicely.     Add heat or ice accordingly if needed. Remember that heat and inflammation are friends, so you typically want to follow-up with cold.    Thank you and have a great day!  Avis Evans. (voice) M.S., CCC-SLP  Speech-Language Pathologist  [facility]  [phone]  [email or web address]  Pronouns: she/her/hers

## 2024-10-02 NOTE — PROGRESS NOTES
Dakota Corley is a 38 year old male who is being evaluated via a billable video visit.      Dakota has been notified and verbally consented to the following:   This video visit will be conducted between you and your provider.  Patient has opted to conduct today's video visit vs an in-person appointment.   Video visits are billed at different rates depending on your insurance coverage. Please reach out to your insurance provider with any questions.   If during the course of the call the provider feels the appointment is not appropriate, you will not be charged for this service.  Provider has received verbal consent for billable virtual visit from the patient? Yes  Will anyone else be joining your video visit? No    Call initiated at: 1200   Type of Visit Platform Used: [employer] Video  Location of provider: Home  Location of patient: [facility] VOICE CLINIC  PROGRESS REPORT (CPT 38418)    Patient: Dakota Corley  Date of Service: 10/2/2024  Date of Last Service: 9/17/24  Number of Visits: 3  Certification Dates: not needed  Referring Physician: self-referred    Impressions from evaluation on 9/12/24 by Dolly Zacarias (voice), M.S., CCC-SLP:  Dakota Corley is presenting today with Globus Sensation (R09.89) and Dysphonia (R49.0) in the context of Laryngeal Hyperfunction (J38.7) and imbalance in the intrinsic and extrinsic laryngeal musculature. Perceptually, Dakota demonstrates mild-moderately rough and strained voice quality corresponding with elevated AVQI, lower than optimal habitual frequency, non optimal respiratory mechanics, significant L>R perilaryngeal tension, and visible tension of the neck/upper body. Laryngoscopy with stroboscopy was completed and reveals good symmetry and overall normal mucosal wave and amplitude bilaterally. 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 Based on today's evaluation, Dakota would benefit  from a course of speech therapy to improve laryngeal efficiency, reduce laryngeal irritation (mucus), improved respiratory mechanics and coordination with phonation, manual release strategies for perilaryngeal hyperfunction, and improve voice quality and balance of intrinsic/extrinsic laryngeal musculature. We will plan on 2 in person sessions to start, combined with virtual sessions. He [place] to drive [place] the [place] border for the virtual sessions.     SUBJECTIVE:  Since Dakota's last session, he reports the following:   He's seen PT a couple of times and worked a lot on SCM, scalenes, but the right side is particularly tight and tender.   Some days he thinks the SOVT tasks feel better and some days they feel worse afterward.   Increasing his posterior breathing in the ribs has seemed pretty easy, though it's harder when he's also holding a guitar and singing. He knows he needs to practice singing in isolation at home more.  He continues to hit a wall when trying to sing in his upper register.  His throat always feels constricted and restricted.     OBJECTIVE:  Patient Specific Goal Metrics:      9/17/2024     9:00 AM 10/2/2024    12:00 PM   Dysponia SLP Goals   How how severe is your [Throat Discomfort - or use patient specific description], if 0 is no [discomfort] at all and 10 is the worst [discomfort]? 6 7   How much does your throat discomfort bother you?     Quite a bit Quite a bit       THERAPEUTIC ACTIVITIES  Today Dakota participated in the following therapeutic activities:  Counseling and Education:  Asked questions about the nature of his symptoms, and I answered all of these thoroughly.    I provided Dakota with explanation and skilled instruction of:  Laryngeal release technique targeting reduced laryngeal elevation and constriction and improved vocal fold aBduction was instructed  Patient was instructed through negative practice of laryngeal elevation with high-frequency, closed vowel productions and  "low-frequency, open vowel productions in an effort to develop somato-sensory awareness of the laryngeal musculature  This was combined with aBductory breathing maneuvers  Patient was instructed to utilize a silent, yawny, \"uh\"-shaped inhalation with application to all other therapy tasks  He demonstrated adequate accuracy following moderate clinician support  Instruction of exercises for gentle release of hyperfunctional neck musculature and connective tissue during phonation.  Patient demonstrates increased upper body tension, especially in the neck  good self-awareness while completing stretches and gentle self-massage along the anterior, anterior-lateral, posterior-lateral, and posterior neck.  Improvement in neck mobility and voice quality following exercises    Instruction of Home Practice:  A revised regimen for home practice was instructed.  I provided an AVS of important notes of today's therapeutic activities to facilitate practice.    ASSESSMENT/PLAN  Progress toward long-term goals:  Adequate but incomplete progress; please see above    Impressions:  IMPRESSIONS: Globus Sensation (R09.89) and Dysphonia (R49.0) in the context of Laryngeal Hyperfunction (J38.7)    Dakota had a productive session of therapy today, working on laryngeal release and neck stretches.  He will continue to work on his exercises on a daily basis, and work on incorporating the techniques into his daily activities. Speech therapy continues to be medically necessary for Dakota to participate fully and engage in activities of daily living.     Plan:   I will see Dakota in 2 weeks and will plan to check on respiratory mechanics with laryngeal release and fist balloon. Provide manual release in combination with anchor voice, progressing as able. Instruct lateral/AP release, anterior release, and jaw releases.     For home practice goals, see AVS.     TOTAL SERVICE TIME: 55 minutes  TREATMENT (40285)    Avis Zacarias. (voice), M.S., " "[facility]-SLP  Speech-Language Pathologist  [facility]  [phone]  [email or web address]  Pronouns: she/her/hers      *this report was created in part through the use of computerized dictation software, and though reviewed following completion, some typographic errors may persist.  If there is confusion regarding any of this notes contents, please contact me for clarification    OBJECTIVE FINDINGS  PATIENT REPORTED MEASURES  Patient Supplied Answers To Last 2 VHI Questionnaires      9/17/2024     8:39 AM 10/2/2024    11:44 AM   Voice Handicap Index (VHI-10)   My voice makes it difficult for people to hear me 2 2   People have difficulty understanding me in a noisy room 3 3   My voice difficulties restrict my personal and social life.  3 2   I feel left out of conversations because of my voice 2 1   My voice problem causes me to lose income 2 2   I feel as though I have to strain to produce voice 3 4   The clarity of my voice is unpredictable 3 4   My voice problem upsets me 3 4   My voice makes me feel handicapped 3 4   People ask, \"What's wrong with your voice?\" 2 2   VHI-10 26 28          Patient Supplied Answers To Last 2 CSI Questionnaires       No data to display                   Patient Supplied Answers To Last 2 EAT Questionnaires       No data to display                  Patient Supplied Answers to Dyspnea Index Questionnaire:       No data to display               "

## 2024-10-17 ENCOUNTER — OFFICE VISIT (OUTPATIENT)
Dept: OTOLARYNGOLOGY | Facility: CLINIC | Age: 39
End: 2024-10-17
Payer: COMMERCIAL

## 2024-10-17 DIAGNOSIS — R09.A2 GLOBUS SENSATION: ICD-10-CM

## 2024-10-17 DIAGNOSIS — J38.7 LARYNGEAL HYPERFUNCTION: ICD-10-CM

## 2024-10-17 DIAGNOSIS — R49.0 DYSPHONIA: Primary | ICD-10-CM

## 2024-10-17 PROCEDURE — 92507 TX SP LANG VOICE COMM INDIV: CPT | Mod: GN | Performed by: SPEECH-LANGUAGE PATHOLOGIST

## 2024-10-17 NOTE — LETTER
10/17/2024       RE: Dakota Corley  [address] [place] [address]     Dear Colleague,    Thank you for referring your patient, Dakota Corley, to the [facility] VOICE CLINIC [place] at [facility]. Please see a copy of my visit note below.    [facility] VOICE CLINIC  PROGRESS REPORT (CPT [id number])    Patient: Dakota Corley  Date of Service: 10/17/2024  Date of Last Service: 10/2/24  Number of Visits: 4  Certification Dates: not needed  Referring Physician: self-referred  We were joined by my colleague, Ian Acevedo, PhD, CFY-SLP    Impressions from evaluation on 9/12/24 by Dolly Zacarias (voice), M.S., CCC-SLP:  Dakota Corley is presenting today with Globus Sensation (R09.89) and Dysphonia (R49.0) in the context of Laryngeal Hyperfunction (J38.7) and imbalance in the intrinsic and extrinsic laryngeal musculature. Perceptually, Dakota demonstrates mild-moderately rough and strained voice quality corresponding with elevated AVQI, lower than optimal habitual frequency, non optimal respiratory mechanics, significant L>R perilaryngeal tension, and visible tension of the neck/upper body. Laryngoscopy with stroboscopy was completed and reveals good symmetry and overall normal mucosal wave and amplitude bilaterally. 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 Based on today's evaluation, Dakota would benefit from a course of speech therapy to improve laryngeal efficiency, reduce laryngeal irritation (mucus), improved respiratory mechanics and coordination with phonation, manual release strategies for perilaryngeal hyperfunction, and improve voice quality and balance of intrinsic/extrinsic laryngeal musculature. We will plan on 2 in person sessions to start, combined with virtual sessions. He [place] drive [place] the [place] border for the virtual sessions.    SUBJECTIVE:  Since  Dakota's last session, he reports the following:   He got a really bad stomach bug about 2 weeks ago, which caused a lot more mucus and throat tightness. He's just starting to feel better.   He had (prior to getting sick) been noticing a bit more tension in his throat, both while speaking and singing and at rest, wondering if he's doing something wrong or having a reaction to the treatments.   His throat discomfort today is at 5-6/10.    OBJECTIVE:  Patient Specific Goal Metrics:      9/17/2024     9:00 AM 10/2/2024    12:00 PM   Dysponia SLP Goals   How how severe is your [Throat Discomfort - or use patient specific description], if 0 is no [discomfort] at all and 10 is the worst [discomfort]? 6 7   How much does your throat discomfort bother you?     Quite a bit Quite a bit       THERAPEUTIC ACTIVITIES  Today Dakota participated in the following therapeutic activities:  Counseling and Education:  Asked questions about the nature of his symptoms, and I answered all of these thoroughly.    I provided Dakota with explanation and skilled instruction of:  Manual laryngeal massage was performed:  Patient endorsed significant tension throughout the neck and upper body.   Of note, he endorsed more tension/discomfort along the left anterior neck, but demonstrated greater right-sided contriction during manual examination today.  He underwent gentle manual release along the sternocleidomastoids, scalenes, trapezius, mylohyoid/anterior digastric, stylohyoid/posterior digastric, sternohyoid/omohyoid, and thyrohyoid space.   He endorsed feeling that the lateral and anterior laryngeal releases were particularly helpful, stating that his throat and voice felt more open and free.   He was instructed to target a change in head and neck alignment, with a longer spine and chin slightly lowered chin; He was also instructed in a gentle self-massage technique targeting the L>R SCMs.   Introduction to phonation using yawn-sigh technique was  targeted in combination with a gentle release of the perilaryngeal musculature.  Patient demonstrates a habitual tendency to hyper-activate perilaryngeal musculature with onset of phonation or inhalation prior to phonation.  He improves with a quiet inhalation through an open mouth, resulting in a release of the supralaryngeal musculature  Gentle sighs on exhalation were encouraged at a much lower intensity level, using tactile feedback to monitor for hyperactivation of the perilaryngeal musculature.  This was also applied with a head tilted back posture, targeting a release of the anterior neck musculature in combination with phonation.     Instruction of Home Practice:  A revised regimen for home practice was instructed.  I provided an AVS of important notes of today's therapeutic activities to facilitate practice.    ASSESSMENT/PLAN  Progress toward long-term goals:  Adequate but incomplete progress; please see above    Impressions:  IMPRESSIONS: Globus Sensation (R09.89) and Dysphonia (R49.0) in the context of Laryngeal Hyperfunction (J38.7)    Dakota had a productive session of therapy today, working on manual release strategies in the context of phonation/singing.  He will continue to work on his exercises on a daily basis, and work on incorporating the techniques into his daily activities. Speech therapy continues to be medically necessary for Dakota to participate fully and engage in activities of daily living.     Plan:   I will see Dakota in 2 weeks and will plan to check on manual release of R>L tension, monitoring of L SCM with phonation, and head tilt back. Introduce anchor voice in combination and progress as able.     For home practice goals, see AVS.     TOTAL SERVICE TIME: 90 minutes  TREATMENT (00664)    Dolly Zacarias (voice) MTerrenceS., CCC-SLP  Speech-Language Pathologist  [facility] Voice [facility]  [phone]  [email or web address]  Pronouns: she/her/hers      *this report was created in part  "through the use of computerized dictation software, and though reviewed following completion, some typographic errors may persist.  If there is confusion regarding any of this notes contents, please contact me for clarification    OBJECTIVE FINDINGS  PATIENT REPORTED MEASURES  Patient Supplied Answers To Last 2 VHI Questionnaires      10/2/2024    11:44 AM 10/16/2024    12:57 PM   Voice Handicap Index (VHI-10)   My voice makes it difficult for people to hear me 2 2   People have difficulty understanding me in a noisy room 3 3   My voice difficulties restrict my personal and social life.  2 2   I feel left out of conversations because of my voice 1 2   My voice problem causes me to lose income 2 3   I feel as though I have to strain to produce voice 4 3   The clarity of my voice is unpredictable 4 3   My voice problem upsets me 4 4   My voice makes me feel handicapped 4 4   People ask, \"What's wrong with your voice?\" 2 2   VHI-10 28 28          Patient Supplied Answers To Last 2 CSI Questionnaires      10/16/2024    12:57 PM   Cough Severity Index (CSI)   My cough is worse when I lie down 1   My coughing problem causes me to restrict my personal and social life 0   I tend to avoid places because of my cough problem 0   I feel embarrassed because of my coughing problem 0   People ask, ''What's wrong?'' because I cough a lot 0   I run out of air when I cough 0   My coughing problem affects my voice 0   My coughing problem limits my physical activity 0   My coughing problem upsets me 0   People ask me if I am sick because I cough a lot 0   CSI Score 1          Patient Supplied Answers To Last 2 EAT Questionnaires      10/16/2024    12:57 PM   Eating Assessment Tool (EAT-10)   My swallowing problem has caused me to lose weight 0   My swallowing problem interferes with my ability to go out for meals 0   Swallowing liquids takes extra effort 0   Swallowing solids takes extra effort 1   Swallowing pills takes extra effort 0 "   Swallowing is painful 1   The pleasure of eating is affected by my swallowing 1   When I swallow food sticks in my throat 1   I cough when I eat 0   Swallowing is stressful 1   EAT-10 5         Patient Supplied Answers to Dyspnea Index Questionnaire:       No data to display                 Again, thank you for allowing me to participate in the care of your patient.      Sincerely,    Penny Carvalho, SLP

## 2024-10-17 NOTE — PROGRESS NOTES
[facility] VOICE CLINIC  PROGRESS REPORT (CPT [id number])    Patient: Dakota Corley  Date of Service: 10/17/2024  Date of Last Service: 10/2/24  Number of Visits: 4  Certification Dates: not needed  Referring Physician: self-referred  We were joined by my colleague, Ian Acevedo, PhD, CFY-SLP    Impressions from evaluation on 9/12/24 by Dolly Zacarias (voice), M.S., CCC-SLP:  Dakota Corley is presenting today with Globus Sensation (R09.89) and Dysphonia (R49.0) in the context of Laryngeal Hyperfunction (J38.7) and imbalance in the intrinsic and extrinsic laryngeal musculature. Perceptually, Dakota demonstrates mild-moderately rough and strained voice quality corresponding with elevated AVQI, lower than optimal habitual frequency, non optimal respiratory mechanics, significant L>R perilaryngeal tension, and visible tension of the neck/upper body. Laryngoscopy with stroboscopy was completed and reveals good symmetry and overall normal mucosal wave and amplitude bilaterally. 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 Based on today's evaluation, Dakota would benefit from a course of speech therapy to improve laryngeal efficiency, reduce laryngeal irritation (mucus), improved respiratory mechanics and coordination with phonation, manual release strategies for perilaryngeal hyperfunction, and improve voice quality and balance of intrinsic/extrinsic laryngeal musculature. We will plan on 2 in person sessions to start, combined with virtual sessions. He agreed to drive [place] the [place] border for the virtual sessions.    SUBJECTIVE:  Since Dakota's last session, he reports the following:   He got a really bad stomach bug about 2 weeks ago, which caused a lot more mucus and throat tightness. He's just starting to feel better.   He had (prior to getting sick) been noticing a bit more tension in his throat, both while speaking and singing and at rest,  wondering if he's doing something wrong or having a reaction to the treatments.   His throat discomfort today is at 5-6/10.    OBJECTIVE:  Patient Specific Goal Metrics:      9/17/2024     9:00 AM 10/2/2024    12:00 PM   Dysponia SLP Goals   How how severe is your [Throat Discomfort - or use patient specific description], if 0 is no [discomfort] at all and 10 is the worst [discomfort]? 6 7   How much does your throat discomfort bother you?     Quite a bit Quite a bit       THERAPEUTIC ACTIVITIES  Today Dakota participated in the following therapeutic activities:  Counseling and Education:  Asked questions about the nature of his symptoms, and I answered all of these thoroughly.    I provided Dakota with explanation and skilled instruction of:  Manual laryngeal massage was performed:  Patient endorsed significant tension throughout the neck and upper body.   Of note, he endorsed more tension/discomfort along the left anterior neck, but demonstrated greater right-sided contriction during manual examination today.  He underwent gentle manual release along the sternocleidomastoids, scalenes, trapezius, mylohyoid/anterior digastric, stylohyoid/posterior digastric, sternohyoid/omohyoid, and thyrohyoid space.   He endorsed feeling that the lateral and anterior laryngeal releases were particularly helpful, stating that his throat and voice felt more open and free.   He was instructed to target a change in head and neck alignment, with a longer spine and chin slightly lowered chin; He was also instructed in a gentle self-massage technique targeting the L>R SCMs.   Introduction to phonation using yawn-sigh technique was targeted in combination with a gentle release of the perilaryngeal musculature.  Patient demonstrates a habitual tendency to hyper-activate perilaryngeal musculature with onset of phonation or inhalation prior to phonation.  He improves with a quiet inhalation through an open mouth, resulting in a release of the  supralaryngeal musculature  Gentle sighs on exhalation were encouraged at a much lower intensity level, using tactile feedback to monitor for hyperactivation of the perilaryngeal musculature.  This was also applied with a head tilted back posture, targeting a release of the anterior neck musculature in combination with phonation.     Instruction of Home Practice:  A revised regimen for home practice was instructed.  I provided an AVS of important notes of today's therapeutic activities to facilitate practice.    ASSESSMENT/PLAN  Progress toward long-term goals:  Adequate but incomplete progress; please see above    Impressions:  IMPRESSIONS: Globus Sensation (R09.89) and Dysphonia (R49.0) in the context of Laryngeal Hyperfunction (J38.7)    Dakota had a productive session of therapy today, working on manual release strategies in the context of phonation/singing.  He will continue to work on his exercises on a daily basis, and work on incorporating the techniques into his daily activities. Speech therapy continues to be medically necessary for Dakota to participate fully and engage in activities of daily living.     Plan:   I will see Dakota in 2 weeks and will plan to check on manual release of R>L tension, monitoring of L SCM with phonation, and head tilt back. Introduce anchor voice in combination and progress as able.     For home practice goals, see AVS.     TOTAL SERVICE TIME: 90 minutes  TREATMENT (32824)    Dolly Zacarias (voice), M.S., CCC-SLP  Speech-Language Pathologist  [facility]  [phone]  [email or web address]  Pronouns: she/her/hers    ---I attest that the services performed briefly (the head tilted back posture combined with SOVTs) were provided by a clinical fellow with my direct supervision. I performed the majority of services today. ---    Dolly Zacarias (voice), M.S., CCC-SLP  Speech-Language Pathologist  [facility]  "Clinic  [phone]  [email or web address]  Pronouns: she/her/hers      *this report was created in part through the use of computerized dictation software, and though reviewed following completion, some typographic errors may persist.  If there is confusion regarding any of this notes contents, please contact me for clarification    OBJECTIVE FINDINGS  PATIENT REPORTED MEASURES  Patient Supplied Answers To Last 2 VHI Questionnaires      10/2/2024    11:44 AM 10/16/2024    12:57 PM   Voice Handicap Index (VHI-10)   My voice makes it difficult for people to hear me 2 2   People have difficulty understanding me in a noisy room 3 3   My voice difficulties restrict my personal and social life.  2 2   I feel left out of conversations because of my voice 1 2   My voice problem causes me to lose income 2 3   I feel as though I have to strain to produce voice 4 3   The clarity of my voice is unpredictable 4 3   My voice problem upsets me 4 4   My voice makes me feel handicapped 4 4   People ask, \"What's wrong with your voice?\" 2 2   VHI-10 28 28          Patient Supplied Answers To Last 2 CSI Questionnaires      10/16/2024    12:57 PM   Cough Severity Index (CSI)   My cough is worse when I lie down 1   My coughing problem causes me to restrict my personal and social life 0   I tend to avoid places because of my cough problem 0   I feel embarrassed because of my coughing problem 0   People ask, ''What's wrong?'' because I cough a lot 0   I run out of air when I cough 0   My coughing problem affects my voice 0   My coughing problem limits my physical activity 0   My coughing problem upsets me 0   People ask me if I am sick because I cough a lot 0   CSI Score 1          Patient Supplied Answers To Last 2 EAT Questionnaires      10/16/2024    12:57 PM   Eating Assessment Tool (EAT-10)   My swallowing problem has caused me to lose weight 0   My swallowing problem interferes with my ability to go out for meals 0 "   Swallowing liquids takes extra effort 0   Swallowing solids takes extra effort 1   Swallowing pills takes extra effort 0   Swallowing is painful 1   The pleasure of eating is affected by my swallowing 1   When I swallow food sticks in my throat 1   I cough when I eat 0   Swallowing is stressful 1   EAT-10 5         Patient Supplied Answers to Dyspnea Index Questionnaire:       No data to display

## 2024-10-17 NOTE — PATIENT INSTRUCTIONS
Hello!     It was a pleasure to see you today. Please complete the exercises below and remember, a few minutes of practice many times throughout the day is more important than one large practice session. If you have any questions about your strategies, feel free to contact me at [email or web address] or via [email or web address]. Please call [phone] for scheduling alterations or to be seen sooner in case of cancellations.      Home Exercise Program:  Your goal is: Progress, not perfection.        MANUAL RELEASE STRATEGIES:  Lateral Laryngeal Stretch:  1. Position your hands like praying, then rotate both sets of fingers toward yourself and place them along each side of your sridhar's apple (you should feel the bony cartilage under your fingers the entire time. If you feel a strong pulse, your fingers are too wide).   2. Gentle shift your sridhar's apple side-to-side, only about a centimeter in each direction.   3. Decide which direction feels easier. It's totally normal to feel a little crepitance.   4. Shift your sridhar's apple about a centimeter in the easier direction and hold, breathing slow, deep breaths for about 120 seconds. You can add just a stretch to the side or to the side and downward.   5. Shift the other direction and hold, breathing slow, deep breaths for about 120 seconds.   6. You can go back and forth, holding, several times, or just once in each direction.     Anterior Laryngeal Release  Place the flat edge of your pointer along the top of your sridhar's apple, with a slightly downward press.   Take the thumb on your other hand and use it to stick under your chin (the double-chin area) and pull in an upward and forward direction, again without actually sliding across the skin.  If you want a little extra stretch, you can also raise your chin slightly  You can also play with the angle of your thumb and finger. You tended to prefer your finger to be slightly along the right side of your sridhar's apple, with your  thumb stretching up and outward to the right. Feel free to play with the specific angle of the thumb   Hold and take slow breaths for at least 2 minutes, pausing to swallow your spit and then resetting as needed.    Right Sided Shoulder/Neck/Arm  1. Point/reach down and outward with your right arm.   2. Tilt your head toward your left shoulder.   3. You could simply hold this stretch, rotating your skull in either direction.   4. If you'd like, you can also bring your left hand up and gently stick and stretch, either upward or downward.   5. If someone wants to help, they can place flat fingers a couple inches apart and stick and stretch in opposite directions- specifically wherever there is visible tension like a guitar string under the skin.     SCM monitoring  1. Gently pinch/hold your left SCM, closer to the bottom- but not all the way to the knot along the bottom.  2. Focus on keeping this muscle released and relaxed as you inhale between phrases, and keeping it relaxed while singing/speaking.   3. You can use your fingertips to monitor this, OR watch in a mirror/reversed camera.    TILT BACK RELEASE  1. Tilt your head back, lifting your chin toward the ceiling. Keep your back straight.  2. Practice a single sung phrase, but let it be as breathy and weak as it needs to be to avoid your chin pulling downward.  3.a. If you need to, you can lower the pitches until it feels easier, then raise the pitch back up. You can also start to add more voice and volume as the muscles along the front of your neck stay more relaxed.   3.b. If you want, you can also use a liptrill to work into 3a. 6-2-4-5-4-3-2-1 starting a C4 (Middle C) and moving up by half steps to F4.  First, start with your chin straight ahead, and then lift your chin up (use falsetto/breathy voice exclusively at F4).    ____________________________________________________       LARYNGEAL RELEASE  Learning Tasks (only as needed)  1. Place your finger tips  "along the center of your throat/Sridhar's apple  2. Swallow and notice your voice box move up and back down  3. Compare a yawn, high-pitch EE (voice box goes up slightly) with a silent, yawny sigh on \"Huh\" like the word \"hug\" (voice box goes down slightly), gentle baby gorilla \"huh huh huh huh huh\", donkey noise \"ee uh ee uh ee uh\"      EXERCISES (5 breath/hour)   1. Drop your mouth OPEN like the word \"bleh\" with your tongue relaxed forward and flat.   2. Slowly inhale with a SILENT, yawny breath, feeling cool and quiet air flow in across your tongue and far down the back of your throat.   3. See if you sridhar's apple drops ever so slightly as you inhale with your SILENT uh feeling.   4. Repeat slowly 5 times in a row (slowly so you don't get dizzy)              -- Please DO NOT use your tongue to push down!     ADDING VOICE:   Start by tilting your head slightly back as if you're sleeping in a car, letting your jaw and tongue go totally slack (imagine a little drool coming out the side). Use your hands at the base of your neck to gently add a downward pull. Or, you could lightly rest one hand along your chin to keep your jaw relaxed.      1. Now inhale with your OPEN, SILENT yawn  2. Add a voiced sigh on \"Huhhhh\" as you exhale through the same open mouth and open throat.   -- Use a mirror if your mouth wants to close on the sigh  -- Add a real yawn to the sigh if you need help keeping your throat open and relaxed.               -- Place your hands on your chest and feel deep reverbation of your voice               -- Target the same level of ease and flow as your inhale- no more than 2 mph              -- Go slower if you get dizzy, with breaks between each vowel  3. Now do the following sighs 2x each: Huhhhh, Hooooo, Hohhh, Haaahhh, Heeeyyyy, Heeee, Hiiiiii, Hoooowww              -- Keep the \"UH\" shape in your throat, letting the tongue and lips make the vowel.              -- Your jaw is NOT invited.  Keep monitoring " "your gas pedal- no \"faster\" than 2 mph- gentle and floaty (no pushing!)  ____________________________________________________     BELLY BREATHING  Stretch: (each morning)  Stretch your arms up and take two slow, deep breaths, feeling your rib cage lift and stretch.    Now lean toward each side, taking 2 slow, deep breaths on each side, feeling your ribcage lift and expand along the sides.   Now leaning back, arching your back to feel a stretch along the front of your ribs, and take 2 more slow, deep breaths. Really let the abdomen expand fully.  Lastly, lean forward so that your head, neck, and arms are completely slack toward the ground. You can also give yourself a tight bear hug across your front.  Hold for 30 seconds and allow your back and neck to stretch and take several slow, deep breaths. You should be able to feel a bit of expansion between your spine and shoulder blades as you inhale, then feel the shoulder blades pull back inward on exhalation.       Practice: (5 breaths per hour or 10 breaths at each meal)  Sit hinged forward with your elbows on your knees. OR Sit/stand normally (give yourself permission to add 2% of a sit-up, gorilla posture)  Decide whether you prefer to place your hands on either side of your belly and back or low ribs, or one of each.  If someone is nearby to help, they can place their hands on the sides of your back and ribs.   Slowly breathe in and feel your belly and back expand, like filling a balloon, pushing out against your waistband  Slowly breathe out (on \"Shhhhhh\") and feel your belly and back crunch inward, like zipping tight pants  Repeat slowly and take breaks if you get dizzy     HELPFUL HINTS:  -- Tie a string or piece of elastic around your low ribs when you get dressed. You'll be able to feel yourself expand against this as you breathe all day.   -- Some people find it easiest to practice while laying on their back or in a recliner, hands on their belly.  -- You can " "also roll onto your stomach, resting your arms above your head, in order to feel more back expansion.   -- You can wear a weight belt when exercising and know that when you're exerting yourself, you don't expand as much in your abdomen. You'll need to keep your lower abs contracted for stability, but you can shift the focus to your solar plexus and ribs. \"Upper belly\"  -- I would not plan to wear something tight like a weight belt throughout the day, unless you're actively exercising. Something small like the elastic band for a heart-rate monitor. Remember that simply moving these joints is challenge enough to start with, and if you want more resistance, you can add a pause after the inhalation to strengthen the inspiratory muscles with controlled abdominal contraction.   ____________________________________________________     VOICE EXERCISES (AM & PM, more if needed)  -- Inhale SILENTLY with your LOW UH release and feel your belly fill with air  -- Slowly exhale as your belly contracts  -- Blow through a tight fist, making your cheeks and throat balloon  -- \"Whoooooo\" with UH in your throat.   1. 3-5x silently with just air for 5-7 seconds  2. 3-5x foghorn on single pitch for 5-7 seconds  3. 3-5x sliding lower for 5-7 seconds (\"ding dong\")  4. 3-5x sliding higher for 5-7 seconds (\"here comes the bride\")(think a little breathier when you go high)  5. 3-5x sliding up and down like sirens  8. BONUS: Easy songs/melodies with slurred phrases and no loss of balloon  -- Double-check that your inhale is a SILENT yawn and your CHEEKS ARE BALLOONED     ____________________________________________________      Neck Muscle Release:   (Do this first thing in the morning, and any time your neck muscles start to feel strained or tired or sore)     1. Front of the neck: Tilt your head back, letting your chin stretch toward the ceiling. Avoid clenching your teeth, but keep your mouth closed.   -- If you want a bit of extra " "stretch, you may jut your chin forward slightly.   -- For some additional stretch, place your hands flat at the base of your neck- stick like a tree frog and add a downward pull.   -- You can also imagine a string attached to the corner of your jaw, lifting from one side or the other, depending on what feels best to you.   -- If you want to direct the stretch a little more specifically, you can use two finger tips to apply opposite direction to \"tree frog\" stretch. Avoid the carotid artery- if you feel a strong pulse- move your fingers!     2. Front/Side of neck : Tilt your left ear toward your left shoulder, then stick your right arm behind the small of your back. Hold for 30-60 seconds.  -- You can then keep your head tilted and rotate your chin toward where your right ear was in space. You can play with the particular angle of your chin to find the best stretch. You can finger massage with your left hand. Hold for 30-60 seconds.  -- Then keep your head tilted, but rotate the other direction to look down at your left arm pit. If you'd like a little extra stretch, you can rest the weight of your left hand on the back of your skull (do not pull!). Hold for 30-60 seconds.     -- Don't forget to switch sides and do both rotation directions.     3. Sternocleidomastoid Massage: To find this muscle, turn your head to the side and feel the muscle sticking out from the bump behind your ear down to the clavicle and sternum. Then look straight forward and use the flat parts of your thumb and pointer to gently pinch and knead/massage your SCM.   -- Add some gentle voicing while monitoring the SCM and keeping it relaxed.   -- Only do one side at a time! If you feel a strong pulse, move your fingers!     4. Back of the neck: Use one hand at a time to gently grasp the back of your neck like a puppy being picked up. Place your other hand flat at the base of your neck, stickling like a tree frog with a downward drag.   --Pinch and " pull, adding a directional pull in whatever direction feels best.   -- Switch hands and repeat.   Sit up tall, then slightly tuck your chin as if looking at your top button. You should feel a slight along the base of your skull/neck.   -- Lace your fingers together and rest the weight of your hands/arm on the back of your head. Don't pull, but just use the weight to add some extra stretch. You should feel the stretch all the way down your spine. Imagine sending your air to the tight areas as you inhale, then feel the tightness release just a little more as you exhale.      For your back/shoulders, some people find it very helpful to put a tennis ball in a long sock and tie a knot at the end to keep the ball there. You can then easily hold the knee end of the sock in your hand and toss the ball over your shoulder, leaning up against a door or wall to massage your back.   -- This can also be very nice with two smaller balls (racketballs) in a sock. Lay on the floor and place the two balls along the base of your skull. Nodding and side-to-side can massage really nicely.      Add heat or ice accordingly if needed. Remember that heat and inflammation are friends, so you typically want to follow-up with cold.     Thank you and have a great day!  Avis Evans. (voice) M.S., CCC-SLP  Speech-Language Pathologist  [facility]  [phone]  [email or web address]  Pronouns: she/her/hers

## 2024-10-30 ENCOUNTER — VIRTUAL VISIT (OUTPATIENT)
Dept: OTOLARYNGOLOGY | Facility: CLINIC | Age: 39
End: 2024-10-30
Payer: COMMERCIAL

## 2024-10-30 DIAGNOSIS — R49.0 DYSPHONIA: Primary | ICD-10-CM

## 2024-10-30 DIAGNOSIS — R09.A2 GLOBUS SENSATION: ICD-10-CM

## 2024-10-30 DIAGNOSIS — J38.7 LARYNGEAL HYPERFUNCTION: ICD-10-CM

## 2024-10-30 PROCEDURE — 92507 TX SP LANG VOICE COMM INDIV: CPT | Mod: GN | Performed by: SPEECH-LANGUAGE PATHOLOGIST

## 2024-10-30 NOTE — PROGRESS NOTES
Dakota Corley is a 38 year old male who is being evaluated via a billable video visit.      Dakota has been notified and verbally consented to the following:   This video visit will be conducted between you and your provider.  Patient has opted to conduct today's video visit vs an in-person appointment.   Video visits are billed at different rates depending on your insurance coverage. Please reach out to your insurance provider with any questions.   If during the course of the call the provider feels the appointment is not appropriate, you will not be charged for this service.  Provider has received verbal consent for billable virtual visit from the patient? Yes  Will anyone else be joining your video visit? No    Call initiated at: 1200   Type of Visit Platform Used: [employer] Video  Location of provider: Home  Location of patient: [facility]  PROGRESS REPORT (CPT 27695)    Patient: Dakota Corley  Date of Service: 10/30/2024  Date of Last Service: 10/17/24  Number of Visits: 5  Certification Dates: not needed  Referring Physician: self-referred    Impressions from evaluation on 9/12/24 by Dolly Zacarias (voice), M.S., CCC-SLP:  Dakota Corley is presenting today with Globus Sensation (R09.89) and Dysphonia (R49.0) in the context of Laryngeal Hyperfunction (J38.7) and imbalance in the intrinsic and extrinsic laryngeal musculature. Perceptually, Dakota demonstrates mild-moderately rough and strained voice quality corresponding with elevated AVQI, lower than optimal habitual frequency, non optimal respiratory mechanics, significant L>R perilaryngeal tension, and visible tension of the neck/upper body. Laryngoscopy with stroboscopy was completed and reveals good symmetry and overall normal mucosal wave and amplitude bilaterally. 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 Based on today's evaluation, Dakota would benefit  from a course of speech therapy to improve laryngeal efficiency, reduce laryngeal irritation (mucus), improved respiratory mechanics and coordination with phonation, manual release strategies for perilaryngeal hyperfunction, and improve voice quality and balance of intrinsic/extrinsic laryngeal musculature. We will plan on 2 in person sessions to start, combined with virtual sessions. He agreed to drive across the [place] border for the virtual sessions.    SUBJECTIVE:  Since Dakota's last session, he reports the following:   The tilt back strategy with voicing has been very helpful, but also difficult due to how much tension he has.  Monitoring his SCMs during a flare-up and the lateral neck releases have been helpful.   He's noticing some improving in vocal quality both in speaking and singing.   Speaking on the phone has felt less labored and effortful.   His physical therapist noted significantly reduced SCM activity with breathing.   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 He's started to sing a little bit more in the car and around the house.   He's trying to get in with a masseuse for some soft tissue work.     OBJECTIVE:  Patient Specific Goal Metrics:      9/17/2024     9:00 AM 10/2/2024    12:00 PM 10/17/2024    10:00 AM   Dysponia SLP Goals   How how severe is your [Throat Discomfort - or use patient specific description], if 0 is no [discomfort] at all and 10 is the worst [discomfort]? 6 7 8   How much does your throat discomfort bother you?     Quite a bit Quite a bit Somewhat       THERAPEUTIC ACTIVITIES  Today Dakota participated in the following therapeutic activities:  Counseling and Education:  Asked questions about the nature of his symptoms, and I answered all of these thoroughly.  Recommendations for voice use and reinforcement of strategies for a long, upcoming road trip, specifically in regards to the tendency to push vocally over the road noise.     I provided Dakota with explanation  "and skilled instruction of:  Clarification of the anterior laryngeal release strategy, with both images to clarify the muscle targets and demonstration, with patient demonstrating improved accy and confidence.   We did discuss that not all of the strategies may end up being particularly helpful, in which case he can trial and error to determine which are his most helpful strategies to continue using.   Exercises to coordinate phonation with released laryngeal constriction and optimal, flowing airstream.   Slightly increased head tilt position, combining with the strategy instructed by my colleague, Ian Acevedo, PhD, CF-SLP at the previous session, with relaxed jaw and tongue, using hands for an \"anchor point\" at the base of the neck.   A hand on his jaw was facilitative to monitor and reinforce jaw release, with a significant tendency toward mandibular retraction.   Yawn Sigh tasks targeting increased intra-oral space and increased airflow were most facilitating  He progressed through the sigh and word level of phonatory complexity  Instructed to use as a voice warm up, cool down, coordination of breath flow with phonation, and for tissue mobilization.  Patient demonstrated good learning, but will need practice and additional reinforcement  Instruction of Home Practice:  A revised regimen for home practice was instructed.  I provided an AVS of important notes of today's therapeutic activities to facilitate practice.    ASSESSMENT/PLAN  Progress toward long-term goals:  Adequate but incomplete progress; please see above    Impressions:  IMPRESSIONS: Globus Sensation (R09.89) and Dysphonia (R49.0) in the context of Laryngeal Hyperfunction (J38.7)    Dakota had a productive session of therapy today, working on progression of yawn-sighs with anchor release and head tilt back.  He will continue to work on his exercises on a daily basis, and work on incorporating the techniques into his daily activities. Speech therapy " "continues to be medically necessary for Dakota to participate fully and engage in activities of daily living.     Plan:   I will see Dakota in 2 weeks and will plan to review the anterior/submandibular release. Add jaw release strategies, particularly with anchor voice, progressing from words as able.  Check on manual release of R>L tension, monitoring of L SCM with phonation, and head tilt back.     For home practice goals, see AVS.     TOTAL SERVICE TIME: 60 minutes  TREATMENT (31437)    Dolly Zacarias (voice), M.S., CCC-SLP  Speech-Language Pathologist  [facility]  [phone]  [email or web address]  Pronouns: she/her/hers      *this report was created in part through the use of computerized dictation software, and though reviewed following completion, some typographic errors may persist.  If there is confusion regarding any of this notes contents, please contact me for clarification    OBJECTIVE FINDINGS  PATIENT REPORTED MEASURES  Patient Supplied Answers To Last 2 VHI Questionnaires      10/16/2024    12:57 PM 10/29/2024     6:51 PM   Voice Handicap Index (VHI-10)   My voice makes it difficult for people to hear me 2  2    People have difficulty understanding me in a noisy room 3  2    My voice difficulties restrict my personal and social life.  2  2    I feel left out of conversations because of my voice 2  1    My voice problem causes me to lose income 3  2    I feel as though I have to strain to produce voice 3  2    The clarity of my voice is unpredictable 3  3    My voice problem upsets me 4  4    My voice makes me feel handicapped 4  3    People ask, \"What's wrong with your voice?\" 2  1    VHI-10 28 22        Patient-reported          Patient Supplied Answers To Last 2 CSI Questionnaires      10/16/2024    12:57 PM 10/29/2024     6:51 PM   Cough Severity Index (CSI)   My cough is worse when I lie down 1  1    My coughing problem causes me to restrict my personal and social life 0  1    I " tend to avoid places because of my cough problem 0  1    I feel embarrassed because of my coughing problem 0  1    People ask, ''What's wrong?'' because I cough a lot 0  1    I run out of air when I cough 0  1    My coughing problem affects my voice 0  1    My coughing problem limits my physical activity 0  1    My coughing problem upsets me 0  2    People ask me if I am sick because I cough a lot 0  1    CSI Score 1 11        Patient-reported          Patient Supplied Answers To Last 2 EAT Questionnaires      10/16/2024    12:57 PM   Eating Assessment Tool (EAT-10)   My swallowing problem has caused me to lose weight 0    My swallowing problem interferes with my ability to go out for meals 0    Swallowing liquids takes extra effort 0    Swallowing solids takes extra effort 1    Swallowing pills takes extra effort 0    Swallowing is painful 1    The pleasure of eating is affected by my swallowing 1    When I swallow food sticks in my throat 1    I cough when I eat 0    Swallowing is stressful 1    EAT-10 5       Patient-reported         Patient Supplied Answers to Dyspnea Index Questionnaire:       No data to display

## 2024-10-30 NOTE — PATIENT INSTRUCTIONS
Hello!     It was a pleasure to see you today. Please complete the exercises below and remember, a few minutes of practice many times throughout the day is more important than one large practice session. If you have any questions about your strategies, feel free to contact me at [email or web address] or via [employer]. Please call [phone] for scheduling alterations or to be seen sooner in case of cancellations.      Home Exercise Program:  Your goal is: Progress, not perfection.     Laney Lester with [employer], near campus.     Travel Tips:  Try to optimize your seat- forward tilt of the seat, lumbar support  Place tennis balls between your back and the seat in a sock.  Set alarms to get out and stretch.   Keep the radio off if you're talking. Aim to speak more nasally.       MANUAL RELEASE STRATEGIES:  Lateral Laryngeal Stretch:  1. Position your hands like praying, then rotate both sets of fingers toward yourself and place them along each side of your sridahr's apple (you should feel the bony cartilage under your fingers the entire time. If you feel a strong pulse, your fingers are too wide).   2. Gentle shift your sridhar's apple side-to-side, only about a centimeter in each direction.   3. Decide which direction feels easier. It's totally normal to feel a little crepitance.   4. Shift your sridhar's apple about a centimeter in the easier direction and hold, breathing slow, deep breaths for about 120 seconds. You can add just a stretch to the side or to the side and downward.   5. Shift the other direction and hold, breathing slow, deep breaths for about 120 seconds.   6. You can go back and forth, holding, several times, or just once in each direction.      Anterior Laryngeal Release  Place the flat edge of your pointer along the top of your sridhar's apple, with a slightly downward press. You can feel free to shift this finger toward each side, so that you're creating an opposite stretch from your  thumb.  Take the thumb on your other hand and use it to stick under your chin (the double-chin area) and pull in an upward and forward direction, again without actually sliding across the skin.  If you want a little extra stretch, you can also raise your chin slightly  You can also play with the angle of your thumb and finger. You tended to prefer your finger to be slightly along the right side of your sridhar's apple, with your thumb stretching up and outward to the right. Feel free to play with the specific angle of the thumb   Hold and take slow breaths for at least 2 minutes, pausing to swallow your spit and then resetting as needed.     Right Sided Shoulder/Neck/Arm  1. Point/reach down and outward with your right arm.   2. Tilt your head toward your left shoulder.   3. You could simply hold this stretch, rotating your skull in either direction.   4. If you'd like, you can also bring your left hand up and gently stick and stretch, either upward or downward.   5. If someone wants to help, they can place flat fingers a couple inches apart and stick and stretch in opposite directions- specifically wherever there is visible tension like a guitar string under the skin.      SCM monitoring  1. Gently pinch/hold your left SCM, closer to the bottom- but not all the way to the knot along the bottom.  2. Focus on keeping this muscle released and relaxed as you inhale between phrases, and keeping it relaxed while singing/speaking.   3. You can use your fingertips to monitor this, OR watch in a mirror/reversed camera.     TILT BACK RELEASE  1. Tilt your head back, lifting your chin toward the ceiling. Keep your back straight.  2. Practice a single sung phrase, but let it be as breathy and weak as it needs to be to avoid your chin pulling downward.  3.a. If you need to, you can lower the pitches until it feels easier, then raise the pitch back up. You can also start to add more voice and volume as the muscles along the front  "of your neck stay more relaxed.   3.b. If you want, you can also use a liptrill to work into 3a. -5-4-3-2-1 starting a C4 (Middle C) and moving up by half steps to F4.  First, start with your chin straight ahead, and then lift your chin up (use falsetto/breathy voice exclusively at F4).     ____________________________________________________        LARYNGEAL RELEASE  Learning Tasks (only as needed)  1. Place your finger tips along the center of your throat/Sridhar's apple  2. Swallow and notice your voice box move up and back down  3. Compare a yawn, high-pitch EE (voice box goes up slightly) with a silent, yawny sigh on \"Huh\" like the word \"hug\" (voice box goes down slightly), gentle baby gorilla \"huh huh huh huh huh\", donkey noise \"ee uh ee uh ee uh\"      EXERCISES (5 breath/hour)   1. Drop your mouth OPEN like the word \"bleh\" with your tongue relaxed forward and flat.   2. Slowly inhale with a SILENT, yawny breath, feeling cool and quiet air flow in across your tongue and far down the back of your throat.   3. See if you sridhar's apple drops ever so slightly as you inhale with your SILENT uh feeling.   4. Repeat slowly 5 times in a row (slowly so you don't get dizzy)              -- Please DO NOT use your tongue to push down!     ADDING VOICE:   ANCHOR VOICE with TILT BACK   Start by tilting your head back as if you're sleeping in a car, letting your jaw and tongue go totally slack (imagine a little drool coming out the side). Use your hands at the base of your neck to gently add a downward pull.   -- Or, you could lightly rest one hand along your chin to keep your jaw relaxed.   -- You can add a very small side-to-side head shake/wobble. If you find a spot that feels particularly tight or feels particularly good, hold that position. (Left SCM) ([employer])     1. Now inhale with your OPEN, SILENT yawn several times, feeling the air flow unobstructed down the pipe and back out. Remind your jaw and tongue " "to relax.   2. Add a voiced sigh on \"Huhhhh\" as you exhale through the same open mouth and open throat.   -- Focus on EASING into the sigh. Avoid abrupt engagement of the voice.   -- Add a real yawn to the sigh if you need help keeping your throat open and relaxed.               -- Place your hands on your chest and feel deep reverbation of your voice               -- Target the same level of ease and flow as your inhale- no more than 2 mph              -- Go slower if you get dizzy, with breaks between each vowel  3. Now do the following sighs 2x each: Huhhhh, Hooooo, Hohhh, Haaahhh, Heeeyyyy, Heeee, Hiiiiii, Hoooowww              -- Keep the \"UH\" shape in your throat, letting the tongue and lips make the vowel.              -- Your jaw is NOT invited. Keep reminding yourself to let your jaw be slack.   -- Keep the pipe open from bottom to top.   -- Think of \"onion breath\" or aeromatically assaulted  Keep monitoring your gas pedal- no \"faster\" than 2 mph- gentle and floaty (no pushing!)  4. Home, ham, hum, him, hang, hung, hill, hole, hail, heal, black, honey, hello, hammer, human, hollow, holiday, hamburger, humidity, humorous  -- All about the breath, not the voice  -- Replicate the internal and external output    ____________________________________________________     BELLY BREATHING  Stretch: (each morning)  Stretch your arms up and take two slow, deep breaths, feeling your rib cage lift and stretch.    Now lean toward each side, taking 2 slow, deep breaths on each side, feeling your ribcage lift and expand along the sides.   Now leaning back, arching your back to feel a stretch along the front of your ribs, and take 2 more slow, deep breaths. Really let the abdomen expand fully.  Lastly, lean forward so that your head, neck, and arms are completely slack toward the ground. You can also give yourself a tight bear hug across your front.  Hold for 30 seconds and allow your back and neck to stretch and take " "several slow, deep breaths. You should be able to feel a bit of expansion between your spine and shoulder blades as you inhale, then feel the shoulder blades pull back inward on exhalation.       Practice: (5 breaths per hour or 10 breaths at each meal)  Sit hinged forward with your elbows on your knees. OR Sit/stand normally (give yourself permission to add 2% of a sit-up, gorilla posture)  Decide whether you prefer to place your hands on either side of your belly and back or low ribs, or one of each.  If someone is nearby to help, they can place their hands on the sides of your back and ribs.   Slowly breathe in and feel your belly and back expand, like filling a balloon, pushing out against your waistband  Slowly breathe out (on \"Shhhhhh\") and feel your belly and back crunch inward, like zipping tight pants  Repeat slowly and take breaks if you get dizzy     HELPFUL HINTS:  -- Tie a string or piece of elastic around your low ribs when you get dressed. You'll be able to feel yourself expand against this as you breathe all day.   -- Some people find it easiest to practice while laying on their back or in a recliner, hands on their belly.  -- You can also roll onto your stomach, resting your arms above your head, in order to feel more back expansion.   -- You can wear a weight belt when exercising and know that when you're exerting yourself, you don't expand as much in your abdomen. You'll need to keep your lower abs contracted for stability, but you can shift the focus to your solar plexus and ribs. \"Upper belly\"  -- I would not plan to wear something tight like a weight belt throughout the day, unless you're actively exercising. Something small like the elastic band for a heart-rate monitor. Remember that simply moving these joints is challenge enough to start with, and if you want more resistance, you can add a pause after the inhalation to strengthen the inspiratory muscles with controlled abdominal contraction. " "  ____________________________________________________     VOICE EXERCISES (AM & PM, more if needed)  -- Inhale SILENTLY with your LOW UH release and feel your belly fill with air  -- Slowly exhale as your belly contracts  -- Blow through a tight fist, making your cheeks and throat balloon  -- \"Whoooooo\" with UH in your throat.   1. 3-5x silently with just air for 5-7 seconds  2. 3-5x foghorn on single pitch for 5-7 seconds  3. 3-5x sliding lower for 5-7 seconds (\"ding dong\")  4. 3-5x sliding higher for 5-7 seconds (\"here comes the bride\")(think a little breathier when you go high)  5. 3-5x sliding up and down like sirens  8. BONUS: Easy songs/melodies with slurred phrases and no loss of balloon  -- Double-check that your inhale is a SILENT yawn and your CHEEKS ARE BALLOONED     ____________________________________________________        Neck Muscle Release:   (Do this first thing in the morning, and any time your neck muscles start to feel strained or tired or sore)     1. Front of the neck: Tilt your head back, letting your chin stretch toward the ceiling. Avoid clenching your teeth, but keep your mouth closed.   -- If you want a bit of extra stretch, you may jut your chin forward slightly.   -- For some additional stretch, place your hands flat at the base of your neck- stick like a tree frog and add a downward pull.   -- You can also imagine a string attached to the corner of your jaw, lifting from one side or the other, depending on what feels best to you.   -- If you want to direct the stretch a little more specifically, you can use two finger tips to apply opposite direction to \"tree frog\" stretch. Avoid the carotid artery- if you feel a strong pulse- move your fingers!     2. Front/Side of neck : Tilt your left ear toward your left shoulder, then stick your right arm behind the small of your back. Hold for 30-60 seconds.  -- You can then keep your head tilted and rotate your chin toward where your right ear " was in space. You can play with the particular angle of your chin to find the best stretch. You can finger massage with your left hand. Hold for 30-60 seconds.  -- Then keep your head tilted, but rotate the other direction to look down at your left arm pit. If you'd like a little extra stretch, you can rest the weight of your left hand on the back of your skull (do not pull!). Hold for 30-60 seconds.     -- Don't forget to switch sides and do both rotation directions.     3. Sternocleidomastoid Massage: To find this muscle, turn your head to the side and feel the muscle sticking out from the bump behind your ear down to the clavicle and sternum. Then look straight forward and use the flat parts of your thumb and pointer to gently pinch and knead/massage your SCM.   -- Add some gentle voicing while monitoring the SCM and keeping it relaxed.   -- Only do one side at a time! If you feel a strong pulse, move your fingers!     4. Back of the neck: Use one hand at a time to gently grasp the back of your neck like a puppy being picked up. Place your other hand flat at the base of your neck, stickling like a tree frog with a downward drag.   --Pinch and pull, adding a directional pull in whatever direction feels best.   -- Switch hands and repeat.   Sit up tall, then slightly tuck your chin as if looking at your top button. You should feel a slight along the base of your skull/neck.   -- Lace your fingers together and rest the weight of your hands/arm on the back of your head. Don't pull, but just use the weight to add some extra stretch. You should feel the stretch all the way down your spine. Imagine sending your air to the tight areas as you inhale, then feel the tightness release just a little more as you exhale.      For your back/shoulders, some people find it very helpful to put a tennis ball in a long sock and tie a knot at the end to keep the ball there. You can then easily hold the knee end of the sock in your  hand and toss the ball over your shoulder, leaning up against a door or wall to massage your back.   -- This can also be very nice with two smaller balls (racketballs) in a sock. Lay on the floor and place the two balls along the base of your skull. Nodding and side-to-side can massage really nicely.      Add heat or ice accordingly if needed. Remember that heat and inflammation are friends, so you typically want to follow-up with cold.     Thank you and have a great day!  Avis Evans. (voice), M.S., CCC-SLP  Speech-Language Pathologist  [facility]  [phone]  [email or web address]  Pronouns: she/her/hers

## 2024-10-30 NOTE — LETTER
10/30/2024       RE: Daktoa Corlye  [address] [facility] [address]     Dear Colleague,    Thank you for referring your patient, Dakota Corley, to the [facility] [place] at [facility]. Please see a copy of my visit note below.    Dakota Corley is a 38 year old male who is being evaluated via a billable video visit.      Dakota has been notified and verbally consented to the following:   This video visit will be conducted between you and your provider.  Patient has opted to conduct today's video visit vs an in-person appointment.   Video visits are billed at different rates depending on your insurance coverage. Please reach out to your insurance provider with any questions.   If during the course of the call the provider feels the appointment is not appropriate, you will not be charged for this service.  Provider has received verbal consent for billable virtual visit from the patient? Yes  Will anyone else be joining your video visit? No    Call initiated at: 1200   Type of Visit Platform Used: [employer] Video  Location of provider: Home  Location of patient: [facility]  PROGRESS REPORT (CPT 04425)    Patient: Dakota Corley  Date of Service: 10/30/2024  Date of Last Service: 10/17/24  Number of Visits: 5  Certification Dates: not needed  Referring Physician: self-referred    Impressions from evaluation on 9/12/24 by Dolly Zacarias (voice) MTerrenceSTerrence, CCC-SLP:  Dakota Corley is presenting today with Globus Sensation (R09.89) and Dysphonia (R49.0) in the context of Laryngeal Hyperfunction (J38.7) and imbalance in the intrinsic and extrinsic laryngeal musculature. Perceptually, Dakota demonstrates mild-moderately rough and strained voice quality corresponding with elevated AVQI, lower than optimal habitual frequency, non optimal respiratory mechanics, significant L>R perilaryngeal tension, and visible tension of the neck/upper body.  Laryngoscopy with stroboscopy was completed and reveals good symmetry and overall normal mucosal wave and amplitude bilaterally. 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 Based on today's evaluation, Dakota would benefit from a course of speech therapy to improve laryngeal efficiency, reduce laryngeal irritation (mucus), improved respiratory mechanics and coordination with phonation, manual release strategies for perilaryngeal hyperfunction, and improve voice quality and balance of intrinsic/extrinsic laryngeal musculature. We will plan on 2 in person sessions to start, combined with virtual sessions. He agreed to drive across the [place] border for the virtual sessions.    SUBJECTIVE:  Since Dakota's last session, he reports the following:   The tilt back strategy with voicing has been very helpful, but also difficult due to how much tension he has.  Monitoring his SCMs during a flare-up and the lateral neck releases have been helpful.   He's noticing some improving in vocal quality both in speaking and singing.   Speaking on the phone has felt less labored and effortful.   His physical therapist noted significantly reduced SCM activity with breathing.   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 He's started to sing a little bit more in the car and around the house.   He's trying to get in with a masseuse for some soft tissue work.     OBJECTIVE:  Patient Specific Goal Metrics:      9/17/2024     9:00 AM 10/2/2024    12:00 PM 10/17/2024    10:00 AM   Dysponia SLP Goals   How how severe is your [Throat Discomfort - or use patient specific description], if 0 is no [discomfort] at all and 10 is the worst [discomfort]? 6 7 8   How much does your throat discomfort bother you?     Quite a bit Quite a bit Somewhat       THERAPEUTIC ACTIVITIES  Today Dakota participated in the following therapeutic activities:  Counseling  "and Education:  Asked questions about the nature of his symptoms, and I answered all of these thoroughly.  Recommendations for voice use and reinforcement of strategies for a long, upcoming road trip, specifically in regards to the tendency to push vocally over the road noise.     I provided Dakota with explanation and skilled instruction of:  Clarification of the anterior laryngeal release strategy, with both images to clarify the muscle targets and demonstration, with patient demonstrating improved accy and confidence.   We did discuss that not all of the strategies may end up being particularly helpful, in which case he can trial and error to determine which are his most helpful strategies to continue using.   Exercises to coordinate phonation with released laryngeal constriction and optimal, flowing airstream.   Slightly increased head tilt position, combining with the strategy instructed by my colleague, Ian Acevedo, PhD, CF-SLP at the previous session, with relaxed jaw and tongue, using hands for an \"anchor point\" at the base of the neck.   A hand on his jaw was facilitative to monitor and reinforce jaw release, with a significant tendency toward mandibular retraction.   Yawn Sigh tasks targeting increased intra-oral space and increased airflow were most facilitating  He progressed through the sigh and word level of phonatory complexity  Instructed to use as a voice warm up, cool down, coordination of breath flow with phonation, and for tissue mobilization.  Patient demonstrated good learning, but will need practice and additional reinforcement  Instruction of Home Practice:  A revised regimen for home practice was instructed.  I provided an AVS of important notes of today's therapeutic activities to facilitate practice.    ASSESSMENT/PLAN  Progress toward long-term goals:  Adequate but incomplete progress; please see above    Impressions:  IMPRESSIONS: Globus Sensation (R09.89) and Dysphonia (R49.0) in the " "context of Laryngeal Hyperfunction (J38.7)    Dakota had a productive session of therapy today, working on progression of yawn-sighs with anchor release and head tilt back.  He will continue to work on his exercises on a daily basis, and work on incorporating the techniques into his daily activities. Speech therapy continues to be medically necessary for Dakota to participate fully and engage in activities of daily living.     Plan:   I will see Dakota in 2 weeks and will plan to review the anterior/submandibular release. Add jaw release strategies, particularly with anchor voice, progressing from words as able.  Check on manual release of R>L tension, monitoring of L SCM with phonation, and head tilt back.     For home practice goals, see AVS.     TOTAL SERVICE TIME: 60 minutes  TREATMENT (60886)    Dolly Zacarias (voice), M.S., CCC-SLP  Speech-Language Pathologist  [facility]  [phone]  [email or web address]  Pronouns: she/her/hers      *this report was created in part through the use of computerized dictation software, and though reviewed following completion, some typographic errors may persist.  If there is confusion regarding any of this notes contents, please contact me for clarification    OBJECTIVE FINDINGS  PATIENT REPORTED MEASURES  Patient Supplied Answers To Last 2 VHI Questionnaires      10/16/2024    12:57 PM 10/29/2024     6:51 PM   Voice Handicap Index (VHI-10)   My voice makes it difficult for people to hear me 2  2    People have difficulty understanding me in a noisy room 3  2    My voice difficulties restrict my personal and social life.  2  2    I feel left out of conversations because of my voice 2  1    My voice problem causes me to lose income 3  2    I feel as though I have to strain to produce voice 3  2    The clarity of my voice is unpredictable 3  3    My voice problem upsets me 4  4    My voice makes me feel handicapped 4  3    People ask, \"What's wrong with your " "voice?\" 2  1    VHI-10 28 22        Patient-reported          Patient Supplied Answers To Last 2 CSI Questionnaires      10/16/2024    12:57 PM 10/29/2024     6:51 PM   Cough Severity Index (CSI)   My cough is worse when I lie down 1  1    My coughing problem causes me to restrict my personal and social life 0  1    I tend to avoid places because of my cough problem 0  1    I feel embarrassed because of my coughing problem 0  1    People ask, ''What's wrong?'' because I cough a lot 0  1    I run out of air when I cough 0  1    My coughing problem affects my voice 0  1    My coughing problem limits my physical activity 0  1    My coughing problem upsets me 0  2    People ask me if I am sick because I cough a lot 0  1    CSI Score 1 11        Patient-reported          Patient Supplied Answers To Last 2 EAT Questionnaires      10/16/2024    12:57 PM   Eating Assessment Tool (EAT-10)   My swallowing problem has caused me to lose weight 0    My swallowing problem interferes with my ability to go out for meals 0    Swallowing liquids takes extra effort 0    Swallowing solids takes extra effort 1    Swallowing pills takes extra effort 0    Swallowing is painful 1    The pleasure of eating is affected by my swallowing 1    When I swallow food sticks in my throat 1    I cough when I eat 0    Swallowing is stressful 1    EAT-10 5       Patient-reported         Patient Supplied Answers to Dyspnea Index Questionnaire:       No data to display                 Again, thank you for allowing me to participate in the care of your patient.      Sincerely,    Penny Carvalho, SLP    "

## 2024-11-21 ENCOUNTER — OFFICE VISIT (OUTPATIENT)
Dept: OTOLARYNGOLOGY | Facility: CLINIC | Age: 39
End: 2024-11-21
Payer: COMMERCIAL

## 2024-11-21 DIAGNOSIS — R49.0 DYSPHONIA: Primary | ICD-10-CM

## 2024-11-21 DIAGNOSIS — J38.7 LARYNGEAL HYPERFUNCTION: ICD-10-CM

## 2024-11-21 DIAGNOSIS — R09.A2 GLOBUS SENSATION: ICD-10-CM

## 2024-11-21 PROCEDURE — 92507 TX SP LANG VOICE COMM INDIV: CPT | Mod: GN | Performed by: SPEECH-LANGUAGE PATHOLOGIST

## 2024-11-21 NOTE — PATIENT INSTRUCTIONS
Hello!     It was a pleasure to see you today. Please complete the exercises below and remember, a few minutes of practice many times throughout the day is more important than one large practice session. If you have any questions about your strategies, feel free to contact me at [email or web address] or via [email or web address]. Please call [phone] for scheduling alterations or to be seen sooner in case of cancellations.      Home Exercise Program:  Your goal is: Progress, not perfection.      Laney Lester with [employer], near campus.     General Posture Tips:   Knees slightly bent, hip creases, breathe into the back.  Jaw slightly back and lift the base of the skull with inhalations.     Travel Tips:  Try to optimize your seat- forward tilt of the seat, lumbar support  Place tennis balls between your back and the seat in a sock.  Set alarms to get out and stretch.   Keep the radio off if you're talking. Aim to speak more nasally.         MANUAL RELEASE STRATEGIES:  Lateral Laryngeal Stretch:  1. Position your hands like praying, then rotate both sets of fingers toward yourself and place them along each side of your sridhar's apple (you should feel the bony cartilage under your fingers the entire time. If you feel a strong pulse, your fingers are too wide).   2. Gentle shift your sridhar's apple side-to-side, only about a centimeter in each direction.   3. Decide which direction feels easier. It's totally normal to feel a little crepitance.   4. Shift your sridhar's apple about a centimeter in the easier direction and hold, breathing slow, deep breaths for about 120 seconds. You can add just a stretch to the side or to the side and downward.   5. Shift the other direction and hold, breathing slow, deep breaths for about 120 seconds.   6. You can go back and forth, holding, several times, or just once in each direction.      Anterior Laryngeal Release  Place the flat edge of your pointer along the top of your  sridhar's apple, with a slightly downward press. You can feel free to shift this finger toward each side, so that you're creating an opposite stretch from your thumb.  Take the thumb on your other hand and use it to stick under your chin (the double-chin area) and pull in an upward and forward direction, again without actually sliding across the skin.  If you want a little extra stretch, you can also raise your chin slightly  You can also play with the angle of your thumb and finger. You tended to prefer your finger to be slightly along the right side of your sridhar's apple, with your thumb stretching up and outward to the right. Feel free to play with the specific angle of the thumb   Hold and take slow breaths for at least 2 minutes, pausing to swallow your spit and then resetting as needed.     Right Sided Shoulder/Neck/Arm  1. Point/reach down and outward with your right arm.   2. Tilt your head toward your left shoulder.   3. You could simply hold this stretch, rotating your skull in either direction.   4. If you'd like, you can also bring your left hand up and gently stick and stretch, either upward or downward.   5. If someone wants to help, they can place flat fingers a couple inches apart and stick and stretch in opposite directions- specifically wherever there is visible tension like a guitar string under the skin.      SCM monitoring  1. Gently pinch/hold your left SCM, closer to the bottom- but not all the way to the knot along the bottom.  2. Focus on keeping this muscle released and relaxed as you inhale between phrases, and keeping it relaxed while singing/speaking.   3. You can use your fingertips to monitor this, OR watch in a mirror/reversed camera.     TILT BACK RELEASE  1. Tilt your head back, lifting your chin toward the ceiling. Keep your back straight.  2. Practice a single sung phrase, but let it be as breathy and weak as it needs to be to avoid your chin pulling downward.  3.a. If you need to,  "you can lower the pitches until it feels easier, then raise the pitch back up. You can also start to add more voice and volume as the muscles along the front of your neck stay more relaxed.   3.b. If you want, you can also use a liptrill to work into 3a. 4-4-4-5-4-3-2-1 starting a C4 (Middle C) and moving up by half steps to F4.  First, start with your chin straight ahead, and then lift your chin up (use falsetto/breathy voice exclusively at F4).     ____________________________________________________        LARYNGEAL RELEASE  Learning Tasks (only as needed)  1. Place your finger tips along the center of your throat/Sridhar's apple  2. Swallow and notice your voice box move up and back down  3. Compare a yawn, high-pitch EE (voice box goes up slightly) with a silent, yawny sigh on \"Huh\" like the word \"hug\" (voice box goes down slightly), gentle baby gorilla \"huh huh huh huh huh\", donkey noise \"ee uh ee uh ee uh\"      EXERCISES (5 breath/hour)   1. Drop your mouth OPEN like the word \"bleh\" with your tongue relaxed forward and flat.   2. Slowly inhale with a SILENT, yawny breath, feeling cool and quiet air flow in across your tongue and far down the back of your throat.   3. See if you sridhar's apple drops ever so slightly as you inhale with your SILENT uh feeling.   4. Repeat slowly 5 times in a row (slowly so you don't get dizzy)              -- Please DO NOT use your tongue to push down!     ADDING VOICE:   ANCHOR VOICE with TILT BACK   Start by tilting your head back as if you're sleeping in a car, letting your jaw and tongue go totally slack (imagine a little drool coming out the side). Use your hands at the base of your neck to gently add a downward pull.   -- Or, you could lightly rest one hand along your chin to keep your jaw relaxed.   -- You can add a very small side-to-side head shake/wobble. If you find a spot that feels particularly tight or feels particularly good, hold that position. (Left SCM) (The Harry " "Wonder)     1. Now inhale with your OPEN, SILENT yawn several times, feeling the air flow unobstructed down the pipe and back out. Remind your jaw and tongue to relax.   2. Add a voiced sigh on \"Huhhhh\" as you exhale through the same open mouth and open throat.   -- Focus on EASING into the sigh. Avoid abrupt engagement of the voice.   -- Add a real yawn to the sigh if you need help keeping your throat open and relaxed.               -- Place your hands on your chest and feel deep reverbation of your voice               -- Target the same level of ease and flow as your inhale- no more than 2 mph              -- Go slower if you get dizzy, with breaks between each vowel  3. Now do the following sighs 2x each: Huhhhh, Hooooo, Hohhh, Haaahhh, Heeeyyyy, Heeee, Hiiiiii, Hoooowww              -- Keep the \"UH\" shape in your throat, letting the tongue and lips make the vowel.              -- Your jaw is NOT invited. Keep reminding yourself to let your jaw be slack.              -- Keep the pipe open from bottom to top.              -- Think of \"onion breath\" or aeromatically assaulted  Keep monitoring your gas pedal- no \"faster\" than 2 mph- gentle and floaty (no pushing!)  4. Home, ham, hum, him, hang, hung, hill, hole, hail, heal, black, honey, hello, hammer, human, hollow, holiday, hamburger, humidity, humorous  -- All about the breath, not the voice  -- Replicate the internal and external output     ____________________________________________________    Jaw Release  (Practice 2-3x/day)    1. Stick the tip of your tongue into the \"pizza spot\" and suction your tongue to the roof of your mouth. Gently open and close your jaw in a down and back motion 10x, being careful not to pop out the bone in front of your ear. You only need to open an easy, gentle amount. After 10x, keep your tongue anchored to the roof of your mouth and allow your jaw to stay relaxed as you return to your normal activities.  2. Keep your tongue " suctioned to the roof and allow your jaw to simply rest in an open, sleeping-in-a-car manner. Breathe and count to 20.   3. Rest a toothpick or stirrer straw lightly along your bottom lip. Allow your jaw to rest open.   4. Gently massage right behind your jaw bone, under your ear lobe. This can be a tender area so be gentle and stay below a 5/10 intensity. Hold and breathe, counting to 120 seconds.  5. Gently massage the muscle in front your ear along your jaw (masseter) while letting your jaw hang slack, like sleeping in a car. (This is the muscle that you'd catch if you were a fish on a hook. You can also find it by placing your hands flat on the sides of your jaw and gently clenching and unclenching your jaw - you'll feel the masseter pop in and out.) Rolling a tennis ball along this muscle can also feel really helpful.   6. Use the space between your thumb and pointer to press gently back on your chin, feeling a very subtle, slight stretch in your masseter muscle.   7. Point with your index finger and rotate your hand so the thumb is pointing downward, reaching across to the opposite side. Slide your finger nail along your top molars, straight back, until your finger tip hits tissue between your molars and jaw bone. Apply gentle pressure. Hold for 120 seconds, increasing intensity to 5/10, then holding until it reduces to 3/10, then increase and hold.     Tips:   -Remember, aggressive and painful stretching and massage often leads to the muscles tensing in response, so be gentle and coax the muscles into slowly letting go of tension.   -If your jaw feels sore or achy, apply a cool pack to help reduce inflammation.   ____________________________________________________     BELLY BREATHING  Stretch: (each morning)  Stretch your arms up and take two slow, deep breaths, feeling your rib cage lift and stretch.    Now lean toward each side, taking 2 slow, deep breaths on each side, feeling your ribcage lift and expand  "along the sides.   Now leaning back, arching your back to feel a stretch along the front of your ribs, and take 2 more slow, deep breaths. Really let the abdomen expand fully.  Lastly, lean forward so that your head, neck, and arms are completely slack toward the ground. You can also give yourself a tight bear hug across your front.  Hold for 30 seconds and allow your back and neck to stretch and take several slow, deep breaths. You should be able to feel a bit of expansion between your spine and shoulder blades as you inhale, then feel the shoulder blades pull back inward on exhalation.       Practice: (5 breaths per hour or 10 breaths at each meal)  Sit hinged forward with your elbows on your knees. OR Sit/stand normally (give yourself permission to add 2% of a sit-up, gorilla posture)  Decide whether you prefer to place your hands on either side of your belly and back or low ribs, or one of each.  If someone is nearby to help, they can place their hands on the sides of your back and ribs.   Slowly breathe in and feel your belly and back expand, like filling a balloon, pushing out against your waistband  Slowly breathe out (on \"Shhhhhh\") and feel your belly and back crunch inward, like zipping tight pants  Repeat slowly and take breaks if you get dizzy     HELPFUL HINTS:  -- Tie a string or piece of elastic around your low ribs when you get dressed. You'll be able to feel yourself expand against this as you breathe all day.   -- Some people find it easiest to practice while laying on their back or in a recliner, hands on their belly.  -- You can also roll onto your stomach, resting your arms above your head, in order to feel more back expansion.   -- You can wear a weight belt when exercising and know that when you're exerting yourself, you don't expand as much in your abdomen. You'll need to keep your lower abs contracted for stability, but you can shift the focus to your solar plexus and ribs. \"Upper belly\"  -- " "I would not plan to wear something tight like a weight belt throughout the day, unless you're actively exercising. Something small like the elastic band for a heart-rate monitor. Remember that simply moving these joints is challenge enough to start with, and if you want more resistance, you can add a pause after the inhalation to strengthen the inspiratory muscles with controlled abdominal contraction.   ____________________________________________________     VOICE EXERCISES (AM & PM, more if needed)  -- Inhale SILENTLY with your LOW UH release and feel your belly fill with air  -- Slowly exhale as your belly contracts  -- Blow through a tight fist, making your cheeks and throat balloon  -- \"Whoooooo\" with UH in your throat.   1. 3-5x silently with just air for 5-7 seconds  2. 3-5x foghorn on single pitch for 5-7 seconds  3. 3-5x sliding lower for 5-7 seconds (\"ding dong\")  4. 3-5x sliding higher for 5-7 seconds (\"here comes the bride\")(think a little breathier when you go high)  5. 3-5x sliding up and down like sirens  8. BONUS: Easy songs/melodies with slurred phrases and no loss of balloon  -- Double-check that your inhale is a SILENT yawn and your CHEEKS ARE BALLOONED     ____________________________________________________        Neck Muscle Release:   (Do this first thing in the morning, and any time your neck muscles start to feel strained or tired or sore)     1. Front of the neck: Tilt your head back, letting your chin stretch toward the ceiling. Avoid clenching your teeth, but keep your mouth closed.   -- If you want a bit of extra stretch, you may jut your chin forward slightly.   -- For some additional stretch, place your hands flat at the base of your neck- stick like a tree frog and add a downward pull.   -- You can also imagine a string attached to the corner of your jaw, lifting from one side or the other, depending on what feels best to you.   -- If you want to direct the stretch a little more " "specifically, you can use two finger tips to apply opposite direction to \"tree frog\" stretch. Avoid the carotid artery- if you feel a strong pulse- move your fingers!     2. Front/Side of neck : Tilt your left ear toward your left shoulder, then stick your right arm behind the small of your back. Hold for 30-60 seconds.  -- You can then keep your head tilted and rotate your chin toward where your right ear was in space. You can play with the particular angle of your chin to find the best stretch. You can finger massage with your left hand. Hold for 30-60 seconds.  -- Then keep your head tilted, but rotate the other direction to look down at your left arm pit. If you'd like a little extra stretch, you can rest the weight of your left hand on the back of your skull (do not pull!). Hold for 30-60 seconds.     -- Don't forget to switch sides and do both rotation directions.     3. Sternocleidomastoid Massage: To find this muscle, turn your head to the side and feel the muscle sticking out from the bump behind your ear down to the clavicle and sternum. Then look straight forward and use the flat parts of your thumb and pointer to gently pinch and knead/massage your SCM.   -- Add some gentle voicing while monitoring the SCM and keeping it relaxed.   -- Only do one side at a time! If you feel a strong pulse, move your fingers!     4. Back of the neck: Use one hand at a time to gently grasp the back of your neck like a puppy being picked up. Place your other hand flat at the base of your neck, stickling like a tree frog with a downward drag.   --Pinch and pull, adding a directional pull in whatever direction feels best.   -- Switch hands and repeat.   Sit up tall, then slightly tuck your chin as if looking at your top button. You should feel a slight along the base of your skull/neck.   -- Lace your fingers together and rest the weight of your hands/arm on the back of your head. Don't pull, but just use the weight to add " some extra stretch. You should feel the stretch all the way down your spine. Imagine sending your air to the tight areas as you inhale, then feel the tightness release just a little more as you exhale.      For your back/shoulders, some people find it very helpful to put a tennis ball in a long sock and tie a knot at the end to keep the ball there. You can then easily hold the knee end of the sock in your hand and toss the ball over your shoulder, leaning up against a door or wall to massage your back.   -- This can also be very nice with two smaller balls (racketballs) in a sock. Lay on the floor and place the two balls along the base of your skull. Nodding and side-to-side can massage really nicely.      Add heat or ice accordingly if needed. Remember that heat and inflammation are friends, so you typically want to follow-up with cold.     Thank you and have a great day!  Avis Evans. (voice), M.S., CCC-SLP  Speech-Language Pathologist  [facility]  [phone]  [email or web address]  Pronouns: she/her/hers

## 2024-11-21 NOTE — PROGRESS NOTES
[facility] VOICE CLINIC  PROGRESS REPORT (CPT 71613)    Patient: Dakota Corley  Date of Service: 11/21/2024  Date of Last Service: 10/30/24  Number of Visits: 6  Certification Dates: not needed  Referring Physician: self-referred    Impressions from evaluation on 9/12/24 by Dolly Zacarias (voice), M.S., CCC-SLP:  Dakota Corley is presenting today with Globus Sensation (R09.89) and Dysphonia (R49.0) in the context of Laryngeal Hyperfunction (J38.7) and imbalance in the intrinsic and extrinsic laryngeal musculature. Perceptually, Dakota demonstrates mild-moderately rough and strained voice quality corresponding with elevated AVQI, lower than optimal habitual frequency, non optimal respiratory mechanics, significant L>R perilaryngeal tension, and visible tension of the neck/upper body. Laryngoscopy with stroboscopy was completed and reveals good symmetry and overall normal mucosal wave and amplitude bilaterally. 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 Based on today's evaluation, Dakota would benefit from a course of speech therapy to improve laryngeal efficiency, reduce laryngeal irritation (mucus), improved respiratory mechanics and coordination with phonation, manual release strategies for perilaryngeal hyperfunction, and improve voice quality and balance of intrinsic/extrinsic laryngeal musculature. We will plan on 2 in person sessions to start, combined with virtual sessions. He [place] to drive [place] the [place] border for the virtual sessions.    SUBJECTIVE:  Since Dakota's last session, he reports the following:   He saw his PT yesterday who commented that his SCMs have loosened and are the best they've been. His diaphragm and ribs are dropping better with breathing.   He's continued with his lip trills and head tilt back strategies and can tell there's increasing freedom and lightness.   He's shifting his focus to range now,  noticing it has improved, but he knows there are still some patterns which need improvement. His voice still sounds thin and empty.   He's tried to be very conscious of not clenching his jaw and keeping his neck relaxed.   His speaking voice quality can vary day-to-day, but has a slightly improved timbre and range. His girlfriend notes that he less often avoids talking because his voice is tired and she's heard a lot more of his singing voice.     OBJECTIVE:  Patient Specific Goal Metrics:      10/17/2024    10:00 AM 10/30/2024    12:00 PM 11/21/2024    11:00 AM   Dysponia SLP Goals   How how severe is your [Throat Discomfort - or use patient specific description], if 0 is no [discomfort] at all and 10 is the worst [discomfort]? 8 5 4   How much does your throat discomfort bother you?     Somewhat Somewhat A little bit       THERAPEUTIC ACTIVITIES  Today Dakota participated in the following therapeutic activities:  Counseling and Education:  Asked questions about the nature of his symptoms, and I answered all of these thoroughly.    I provided Dakota with explanation and skilled instruction of:  Slight postural adjustments both for phonatory productions and inhalations prior to phonation, to good effect.   Strategies to reduce and release excessive tension in the jaw and sublingual tissues, with overall intention to decrease daryl-laryngeal hyperfunction.   I provided education related to the anatomy and physiology of the jaw and related musculature and bony structures, which he found helpful.   I instructed several jaw stretches and myofacial release tasks utilizing low, constant pressure levels, with visual and verbal demonstrations and explanation.   He was instructed to maintain less than 4/10 intensity of stretch and manual release pressure, and verbalized understanding  These strategies were instructed to be used as needed, but only as long as he does not find they increase discomfort.  Use of an ice pack following  "release strategies may be implemented as needed.    The patient completed liptrills and labial-lingual trills to improve respiratory-phonatory coordination, to develop awareness of airflow during phonation, and to increase vocal range.  Patient used 8-3-8-5-4-3-2-1 on liptrills from B3 to Eb4 with moderate verbal, modeling, respiratory, kinesthetic, and resonatory cues with approximately 70% accuracy.  Patient used 7-2-1-5-4-3-2-1 on labial lingual trills from E4 to A4 with moderate verbal, respiratory, and kinesthetic cues with approximately 60% accuracy.  Patient used 1-4-2-7-6-7-5-3-4-2-3-1-2-8-1 on labial lingual trills from G4 to E4 with minimal verbal, respiratory, and kinesthetic cues with approximately 70% accuracy.  Patient used 9-0-9-9-6-4-5-3-4-2-3-1-2-8-1 on liptrills from Eb4 to Bb3 with minimal verbal, respiratory, kinesthetic, and resonatory cues with approximately 80% accuracy.  The patient observed that they did not feel any additional effort or pain/discomfort while completing this series of exercises  The patient was instructed on the use of variegated pitch-vowel patterns in skilled vocal tasks to improve respiratory-phonatory coordination and to train vocal registration.  The patient completed 8-5-5-3-3-1 on /kev a i a i a/ from B2 to A4 to B2 with minimal verbal, modeling, kinesthetic, and resonatory with approximately 70% accuracy.  Articulatory cue to increase the duration of the /sh/ consonant was facilitating for increased airflow during voicing and decreased vocal strain.  The patient was instructed in the context of professionally relevant generalization tasks (skilled vocal exercises with moderate pitch and loudness variation) with moderate verbal, modeling, kinesthetic, and resonatory cues with approximately 70% accuracy.  The patient sang a [place] song with an approximate range of C3 to F4.  Kinesthetic cue for \"Pinky bite\" and increased vocal loudness were facilitating for " "decreased vocal strain, improved vocal stability in \"passaggio,\" and increased vocal pitch range.   The patient remarked that his voice sounded really good during this task.       Instruction of Home Practice:  A revised regimen for home practice was instructed.  I provided an AVS of important notes of today's therapeutic activities to facilitate practice.    ASSESSMENT/PLAN  Progress toward long-term goals:  Adequate but incomplete progress; please see above    Impressions:  IMPRESSIONS: Globus Sensation (R09.89) and Dysphonia (R49.0) in the context of Laryngeal Hyperfunction (J38.7)    Dakota had a productive session of therapy today, working on combining postural adjustments and manual release strategies with functional phonation tasks.  He will continue to work on his exercises on a daily basis, and work on incorporating the techniques into his daily activities. Speech therapy continues to be medically necessary for Dakota to participate fully and engage in activities of daily living.     Plan:   I will see Dakota in 2 weeks and will plan to observe the session as clinical fellow, Ian Acevedo, PhD, CF-SLP continues to target appropriate balance of intrinsic and extrinsic laryngeal musculature with appropriate air flow levels, throughout vocal range.     For home practice goals, see AVS.     TOTAL SERVICE TIME: 60 minutes  TREATMENT ([id number])    Dolly Zacarias (voice), M.S., CCC-SLP  Speech-Language Pathologist  [facility]  [phone]  [email or web address]  Pronouns: she/her/hers      *this report was created in part through the use of computerized dictation software, and though reviewed following completion, some typographic errors may persist.  If there is confusion regarding any of this notes contents, please contact me for clarification    OBJECTIVE FINDINGS  PATIENT REPORTED MEASURES  Patient Supplied Answers To Last 2 VHI Questionnaires      10/29/2024     6:51 PM 11/21/2024    11:01 AM " "  Voice Handicap Index (VHI-10)   My voice makes it difficult for people to hear me 2  2    People have difficulty understanding me in a noisy room 2  2    My voice difficulties restrict my personal and social life.  2  2    I feel left out of conversations because of my voice 1  1    My voice problem causes me to lose income 2  2    I feel as though I have to strain to produce voice 2  2    The clarity of my voice is unpredictable 3  2    My voice problem upsets me 4  3    My voice makes me feel handicapped 3  2    People ask, \"What's wrong with your voice?\" 1  1    VHI-10 22  19        Patient-reported          Patient Supplied Answers To Last 2 CSI Questionnaires      10/16/2024    12:57 PM 10/29/2024     6:51 PM   Cough Severity Index (CSI)   My cough is worse when I lie down 1  1    My coughing problem causes me to restrict my personal and social life 0  1    I tend to avoid places because of my cough problem 0  1    I feel embarrassed because of my coughing problem 0  1    People ask, ''What's wrong?'' because I cough a lot 0  1    I run out of air when I cough 0  1    My coughing problem affects my voice 0  1    My coughing problem limits my physical activity 0  1    My coughing problem upsets me 0  2    People ask me if I am sick because I cough a lot 0  1    CSI Score 1 11        Patient-reported          Patient Supplied Answers To Last 2 EAT Questionnaires      10/16/2024    12:57 PM   Eating Assessment Tool (EAT-10)   My swallowing problem has caused me to lose weight 0    My swallowing problem interferes with my ability to go out for meals 0    Swallowing liquids takes extra effort 0    Swallowing solids takes extra effort 1    Swallowing pills takes extra effort 0    Swallowing is painful 1    The pleasure of eating is affected by my swallowing 1    When I swallow food sticks in my throat 1    I cough when I eat 0    Swallowing is stressful 1    EAT-10 5       Patient-reported         Patient Supplied " Answers to Dyspnea Index Questionnaire:       No data to display

## 2024-11-21 NOTE — Clinical Note
11/21/2024       RE: Dakota Corley  [address] [facility] [address]     Dear Colleague,    Thank you for referring your patient, Dakota Corley, to the [facility] VOICE CLINIC [facility]. Please see a copy of my visit note below.    No notes on file    Again, thank you for allowing me to participate in the care of your patient.      Sincerely,    Penny Carvalho, SLP

## 2025-02-13 ENCOUNTER — VIRTUAL VISIT (OUTPATIENT)
Dept: OTOLARYNGOLOGY | Facility: CLINIC | Age: 40
End: 2025-02-13
Payer: COMMERCIAL

## 2025-02-13 DIAGNOSIS — R49.0 DYSPHONIA: Primary | ICD-10-CM

## 2025-02-13 DIAGNOSIS — J38.7 LARYNGEAL HYPERFUNCTION: ICD-10-CM

## 2025-02-13 DIAGNOSIS — R09.A2 GLOBUS SENSATION: ICD-10-CM

## 2025-02-13 DIAGNOSIS — R49.9 UNSPECIFIED VOICE AND RESONANCE DISORDER: ICD-10-CM

## 2025-02-13 NOTE — PROGRESS NOTES
Dakota Corley is a 39 year old male who is being evaluated via a billable video visit.      Dakota has been notified and verbally consented to the following:   This video visit will be conducted between you and your provider.  Patient has opted to conduct today's video visit vs an in-person appointment.   Video visits are billed at different rates depending on your insurance coverage. Please reach out to your insurance provider with any questions.   If during the course of the call the provider feels the appointment is not appropriate, you will not be charged for this service.  Provider has received verbal consent for billable virtual visit from the patient? Yes  Will anyone else be joining your video visit? No    Call initiated at: 1200   Type of Visit Platform Used: [employer] Video  Location of provider: Home  Location of patient: [facility] VOICE CLINIC  PROGRESS REPORT (CPT 98177)    Patient: Dakota Corley  Date of Service: 2/13/2025  Date of Last Service: 1/22/25  Number of Visits: 11  Certification Dates: not needed  Referring Physician: self-referred    Impressions from evaluation on 9/12/24 by Dolly Zacarias (voice), M.S., CCC-SLP:  Dakota Corley is presenting today with Globus Sensation (R09.89) and Dysphonia (R49.0) in the context of Laryngeal Hyperfunction (J38.7) and imbalance in the intrinsic and extrinsic laryngeal musculature. Perceptually, Dakota demonstrates mild-moderately rough and strained voice quality corresponding with elevated AVQI, lower than optimal habitual frequency, non optimal respiratory mechanics, significant L>R perilaryngeal tension, and visible tension of the neck/upper body. Laryngoscopy with stroboscopy was completed and reveals good symmetry and overall normal mucosal wave and amplitude bilaterally. 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 Based on today's evaluation, Dakota would benefit  from a course of speech therapy to improve laryngeal efficiency, reduce laryngeal irritation (mucus), improved respiratory mechanics and coordination with phonation, manual release strategies for perilaryngeal hyperfunction, and improve voice quality and balance of intrinsic/extrinsic laryngeal musculature. We will plan on 2 in person sessions to start, combined with virtual sessions. He agreed to drive [place] the [place] border for the virtual sessions.    SUBJECTIVE:  Since Dakota's last session, he reports the following:   He reports that symptoms have improved in the interval since our last appointment.  He notes that the last three days have been difficult as he has recovered from a URI.  He notes that practicing his HEP has been going well.  Primary patient goal: Assess how he is recovering.  Get some cues during exercises for improving vocal pitch range.    OBJECTIVE:  Patient Specific Goal Metrics:      1/9/2025    11:00 AM 1/22/2025     1:00 PM 2/13/2025    12:00 PM   Dysponia SLP Goals   How would you rate your speaking voice quality, if 0 is worst voice quality, and 10 is best voice? 8 8 7   How would you rate your singing voice quality, if 0 is worst voice quality, and 10 is best voice? 6 8 7   How how severe is your [Throat Discomfort - or use patient specific description], if 0 is no [discomfort] at all and 10 is the worst [discomfort]? 4 3 5   How much does your throat discomfort bother you?     Somewhat Somewhat Quite a bit       THERAPEUTIC ACTIVITIES  Today Dakota participated in the following therapeutic activities:  Counseling and Education:  Asked questions about the nature of his symptoms, and I answered all of these thoroughly.    I provided Dakota with explanation and skilled instruction of:    The patient completed liptrills and labial-lingual trills to improve respiratory-phonatory coordination, to develop awareness of airflow during phonation, and to increase vocal range.  Patient used  "8-7-2-5-4-3-2-1 on liptrills from C3 to E4 with minimal verbal, kinesthetic, and resonatory cues.  Patient used 8-1-2-5-4-3-2-1 on labial lingual trills from F4 to C5 with minimal verbal and kinesthetic cues.  Patient used 7-5-5-7-7-8-5-3-4-2-3-1-2-7-1 on labial lingual trills from C5 to F4 with minimal verbal, modeling, and kinesthetic cues.  Patient used 2-1-7-4-6-7-5-3-4-2-3-1-2-7-1 on liptrills from E4 to C3 with minimal verbal and kinesthetic cues.  The patient observed, \"It feels fine.  Definitely noticing connecting to more forward focus and brighter.  And its not hard to get there.\"  Patient produced CTP on Bb4 and higher.    The patient was instructed on the use of variegated pitch-vowel patterns in skilled vocal tasks to improve respiratory-phonatory coordination and to train vocal registration.  The patient completed 8-5-5-3-3-1 on /kev a i a i a/ from C3 to C5 (ceiling) to G3 with maximal verbal, modeling, and phonatory cues.  Patient endorsed benefit from maintaining CTP on 8 and 5.     The patient was instructed on the use of alternating cricothyroid dominant voice production (CTP) and thyroarytenoid dominant voice production (TDP) to improve balance between vocal registers, to improve respiratory-phonatory coordination, and to increase comfortable vocal pitch range.  The patient completed 3-1-6-8-5-1-5 using alternating TDP and CTP on [a u a u a u a] with moderate verbal, modeling, and resonatory cues.  Verbal cues to inhale through maximally occluded lips were facilitating for decreased vocal strain.    The patient was instructed on the use of variegated pitch-vowel patterns in skilled vocal tasks to improve respiratory-phonatory coordination, to train vocal registration, and to increase vocal pitch range.  The patient used 5-4-3-2-1-3-5-8-5-3-1 on [hE hE hE hE she dawit she] from C3 to A4 with moderate verbal, resonatory, modeling, and respiratory cues.  The patient observed, \"I didn't even notice we " "went up to an A.\"  Patient reported his discomfort as a 2 on an OMNI scale of discomfort after completing this task.    Instruction of Home Practice:  A revised home exercise plan (HEP) was collaboratively designed and instructed.  The patient confirmed that the HEP was reasonable and that it seemed attainable for daily practice.  Patient will use a previously recorded HEP to facilitate home practice.    ASSESSMENT/PLAN  Progress toward long-term goals:  Adequate but incomplete progress; please see above    Impressions:  IMPRESSIONS: Globus Sensation (R09.89) and Dysphonia (R49.0) in the context of Laryngeal Hyperfunction (J38.7)    Dakota had a productive session of therapy today, working on skilled voice use exercises that focused on upper vocal pitch range extension and decreasing vocal strain using increased airflow and vocal tract configuration strategies. He reported that his discomfort decreased from a 5 to a 2 on an OMNI scale of discomfort following completion of his modified HEP today. Of note, maximal inhalation with labial occlusion was facilitating for decreased vocal strain today, which suggests that the patient may benefit from use of this shaping task to reduce hyperadduction of the vocal folds while performing skilled voice use tasks.  He will continue to work on his exercises on a daily basis, and work on incorporating the techniques into his daily activities. Speech therapy continues to be medically necessary for Dakota to participate fully and engage in activities of daily living.     Plan:    I will see Dakota in 1 week (2/19/2025) d/t prior illness and subsequent rescheduling.  We will plan to continue targeting reduced hyperfunction and coordination of respiration with phonation in increasingly complex vocationally relevant generalization tasks.    TOTAL SERVICE TIME: 55 minutes  TREATMENT ([id number])    Travis Acevedo M.M., Ph.D., CF-SLP  Clinical Fellow in Voice and Upper Airway Speech-Language " "Pathology  [facility] [email or web address]  Pronouns: he/him      OBJECTIVE FINDINGS  PATIENT REPORTED MEASURES  Patient Supplied Answers To Last 2 VHI Questionnaires      12/6/2024    11:16 AM 1/22/2025    12:28 PM   Voice Handicap Index (VHI-10)   My voice makes it difficult for people to hear me 2 1   People have difficulty understanding me in a noisy room 2 1   My voice difficulties restrict my personal and social life.  1 1   I feel left out of conversations because of my voice 1 1   My voice problem causes me to lose income 2 2   I feel as though I have to strain to produce voice 2 2   The clarity of my voice is unpredictable 2 2   My voice problem upsets me 2 3   My voice makes me feel handicapped 2 2   People ask, \"What's wrong with your voice?\" 1 0   VHI-10 17  15        Patient-reported          Patient Supplied Answers To Last 2 CSI Questionnaires      10/29/2024     6:51 PM 1/22/2025    12:28 PM   Cough Severity Index (CSI)   My cough is worse when I lie down 1 1   My coughing problem causes me to restrict my personal and social life 1 0   I tend to avoid places because of my cough problem 1 0   I feel embarrassed because of my coughing problem 1 0   People ask, ''What's wrong?'' because I cough a lot 1 0   I run out of air when I cough 1 0   My coughing problem affects my voice 1 2   My coughing problem limits my physical activity 1 0   My coughing problem upsets me 2 3   People ask me if I am sick because I cough a lot 1 0   CSI Score 11  6        Patient-reported          Patient Supplied Answers To Last 2 EAT Questionnaires      10/16/2024    12:57 PM   Eating Assessment Tool (EAT-10)   My swallowing problem has caused me to lose weight 0   My swallowing problem interferes with my ability to go out for meals 0   Swallowing liquids takes extra effort 0   Swallowing solids takes extra effort 1   Swallowing pills takes extra effort 0   Swallowing is painful 1   The " pleasure of eating is affected by my swallowing 1   When I swallow food sticks in my throat 1   I cough when I eat 0   Swallowing is stressful 1   EAT-10 5         Patient Supplied Answers to Dyspnea Index Questionnaire:       No data to display

## 2025-02-13 NOTE — LETTER
2/13/2025       RE: Dakota Corley  [address] [facility] [address]     Dear Colleague,    Thank you for referring your patient, Dakota Corley, to the [facility] VOICE CLINIC [place] at [facility]. Please see a copy of my visit note below.    Dakota Corley is a 39 year old male who is being evaluated via a billable video visit.      Dakota has been notified and verbally consented to the following:   This video visit will be conducted between you and your provider.  Patient has opted to conduct today's video visit vs an in-person appointment.   Video visits are billed at different rates depending on your insurance coverage. Please reach out to your insurance provider with any questions.   If during the course of the call the provider feels the appointment is not appropriate, you will not be charged for this service.  Provider has received verbal consent for billable virtual visit from the patient? Yes  Will anyone else be joining your video visit? No    Call initiated at: 1200   Type of Visit Platform Used: [employer]  Location of provider: Home  Location of patient: [facility] VOICE CLINIC  PROGRESS REPORT (CPT 03443)    Patient: Dakota Corley  Date of Service: 2/13/2025  Date of Last Service: 1/22/25  Number of Visits: 11  Certification Dates: not needed  Referring Physician: self-referred    Impressions from evaluation on 9/12/24 by Dolly Zacarias (voice) MTerrenceSTerrence, CCC-SLP:  Dakota Corley is presenting today with Globus Sensation (R09.89) and Dysphonia (R49.0) in the context of Laryngeal Hyperfunction (J38.7) and imbalance in the intrinsic and extrinsic laryngeal musculature. Perceptually, Dakota demonstrates mild-moderately rough and strained voice quality corresponding with elevated AVQI, lower than optimal habitual frequency, non optimal respiratory mechanics, significant L>R perilaryngeal tension, and visible tension of the neck/upper body.  Laryngoscopy with stroboscopy was completed and reveals good symmetry and overall normal mucosal wave and amplitude bilaterally. 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 Based on today's evaluation, Dakota would benefit from a course of speech therapy to improve laryngeal efficiency, reduce laryngeal irritation (mucus), improved respiratory mechanics and coordination with phonation, manual release strategies for perilaryngeal hyperfunction, and improve voice quality and balance of intrinsic/extrinsic laryngeal musculature. We will plan on 2 in person sessions to start, combined with virtual sessions. He [place] to drive [place] the [place] border for the virtual sessions.    SUBJECTIVE:  Since Dakota's last session, he reports the following:   He reports that symptoms have improved in the interval since our last appointment.  He notes that the last three days have been difficult as he has recovered from a URI.  He notes that practicing his HEP has been going well.  Primary patient goal: Assess how he is recovering.  Get some cues during exercises for improving vocal pitch range.    OBJECTIVE:  Patient Specific Goal Metrics:      1/9/2025    11:00 AM 1/22/2025     1:00 PM 2/13/2025    12:00 PM   Dysponia SLP Goals   How would you rate your speaking voice quality, if 0 is worst voice quality, and 10 is best voice? 8 8 7   How would you rate your singing voice quality, if 0 is worst voice quality, and 10 is best voice? 6 8 7   How how severe is your [Throat Discomfort - or use patient specific description], if 0 is no [discomfort] at all and 10 is the worst [discomfort]? 4 3 5   How much does your throat discomfort bother you?     Somewhat Somewhat Quite a bit       THERAPEUTIC ACTIVITIES  Today Dakota participated in the following therapeutic activities:  Counseling and Education:  Asked questions about the nature of his symptoms, and I  "answered all of these thoroughly.    I provided Dakota with explanation and skilled instruction of:    The patient completed liptrills and labial-lingual trills to improve respiratory-phonatory coordination, to develop awareness of airflow during phonation, and to increase vocal range.  Patient used 4-8-2-5-4-3-2-1 on liptrills from C3 to E4 with minimal verbal, kinesthetic, and resonatory cues.  Patient used 9-6-5-5-4-3-2-1 on labial lingual trills from F4 to C5 with minimal verbal and kinesthetic cues.  Patient used 6-6-5-1-9-0-5-3-4-2-3-1-2-7-1 on labial lingual trills from C5 to F4 with minimal verbal, modeling, and kinesthetic cues.  Patient used 7-8-4-9-2-0-5-3-4-2-3-1-2-7-1 on liptrills from E4 to C3 with minimal verbal and kinesthetic cues.  The patient observed, \"It feels fine.  Definitely noticing connecting to more forward focus and brighter.  And its not hard to get there.\"  Patient produced CTP on Bb4 and higher.    The patient was instructed on the use of variegated pitch-vowel patterns in skilled vocal tasks to improve respiratory-phonatory coordination and to train vocal registration.  The patient completed 8-5-5-3-3-1 on /kev a i a i a/ from C3 to C5 (ceiling) to G3 with maximal verbal, modeling, and phonatory cues.  Patient endorsed benefit from maintaining CTP on 8 and 5.     The patient was instructed on the use of alternating cricothyroid dominant voice production (CTP) and thyroarytenoid dominant voice production (TDP) to improve balance between vocal registers, to improve respiratory-phonatory coordination, and to increase comfortable vocal pitch range.  The patient completed 6-4-2-8-5-1-5 using alternating TDP and CTP on [a u a u a u a] with moderate verbal, modeling, and resonatory cues.  Verbal cues to inhale through maximally occluded lips were facilitating for decreased vocal strain.    The patient was instructed on the use of variegated pitch-vowel patterns in skilled vocal tasks to " "improve respiratory-phonatory coordination, to train vocal registration, and to increase vocal pitch range.  The patient used 5-4-3-2-1-3-5-8-5-3-1 on [hE hE hE hE she dawit she] from C3 to A4 with moderate verbal, resonatory, modeling, and respiratory cues.  The patient observed, \"I didn't even notice we went up to an A.\"  Patient reported his discomfort as a 2 on an OMNI scale of discomfort after completing this task.    Instruction of Home Practice:  A revised home exercise plan (HEP) was collaboratively designed and instructed.  The patient confirmed that the HEP was reasonable and that it seemed attainable for daily practice.  Patient will use a previously recorded HEP to facilitate home practice.    ASSESSMENT/PLAN  Progress toward long-term goals:  Adequate but incomplete progress; please see above    Impressions:  IMPRESSIONS: Globus Sensation (R09.89) and Dysphonia (R49.0) in the context of Laryngeal Hyperfunction (J38.7)    Dakota had a productive session of therapy today, working on skilled voice use exercises that focused on upper vocal pitch range extension and decreasing vocal strain using increased airflow and vocal tract configuration strategies. He reported that his discomfort decreased from a 5 to a 2 on an OMNI scale of discomfort following completion of his modified HEP today. Of note, maximal inhalation with labial occlusion was facilitating for decreased vocal strain today, which suggests that the patient may benefit from use of this shaping task to reduce hyperadduction of the vocal folds while performing skilled voice use tasks.  He will continue to work on his exercises on a daily basis, and work on incorporating the techniques into his daily activities. Speech therapy continues to be medically necessary for Dakota to participate fully and engage in activities of daily living.     Plan:    I will see Dakota in 1 week (2/19/2025) d/t prior illness and subsequent rescheduling.  We will plan to " "continue targeting reduced hyperfunction and coordination of respiration with phonation in increasingly complex vocationally relevant generalization tasks.    TOTAL SERVICE TIME: 55 minutes  TREATMENT ([id number])    Travis Acevedo M.M., Ph.D., CF-SLP  Clinical Fellow in Voice and Upper Airway Speech-Language Pathology  [facility] [email or web address] [email or web address]  Pronouns: he/him      OBJECTIVE FINDINGS  PATIENT REPORTED MEASURES  Patient Supplied Answers To Last 2 VHI Questionnaires      12/6/2024    11:16 AM 1/22/2025    12:28 PM   Voice Handicap Index (VHI-10)   My voice makes it difficult for people to hear me 2 1   People have difficulty understanding me in a noisy room 2 1   My voice difficulties restrict my personal and social life.  1 1   I feel left out of conversations because of my voice 1 1   My voice problem causes me to lose income 2 2   I feel as though I have to strain to produce voice 2 2   The clarity of my voice is unpredictable 2 2   My voice problem upsets me 2 3   My voice makes me feel handicapped 2 2   People ask, \"What's wrong with your voice?\" 1 0   VHI-10 17  15        Patient-reported          Patient Supplied Answers To Last 2 CSI Questionnaires      10/29/2024     6:51 PM 1/22/2025    12:28 PM   Cough Severity Index (CSI)   My cough is worse when I lie down 1 1   My coughing problem causes me to restrict my personal and social life 1 0   I tend to avoid places because of my cough problem 1 0   I feel embarrassed because of my coughing problem 1 0   People ask, ''What's wrong?'' because I cough a lot 1 0   I run out of air when I cough 1 0   My coughing problem affects my voice 1 2   My coughing problem limits my physical activity 1 0   My coughing problem upsets me 2 3   People ask me if I am sick because I cough a lot 1 0   CSI Score 11  6        Patient-reported          Patient Supplied Answers To Last 2 EAT Questionnaires      10/16/2024    12:57 PM "   Eating Assessment Tool (EAT-10)   My swallowing problem has caused me to lose weight 0   My swallowing problem interferes with my ability to go out for meals 0   Swallowing liquids takes extra effort 0   Swallowing solids takes extra effort 1   Swallowing pills takes extra effort 0   Swallowing is painful 1   The pleasure of eating is affected by my swallowing 1   When I swallow food sticks in my throat 1   I cough when I eat 0   Swallowing is stressful 1   EAT-10 5         Patient Supplied Answers to Dyspnea Index Questionnaire:       No data to display                 Attestation signed by Penny Carvalho, SLP at 2/13/2025  5:14 PM:  ---I attest that the services performed were provided by a clinical fellow with my direct supervision. ---    Dolly Zacarias (voice), MTerrenceS., CCC-SLP  Speech-Language Pathologist  [facility]  [phone]  [email or web address]  Pronouns: she/her/hers      Again, thank you for allowing me to participate in the care of your patient.      Sincerely,    Travis Acevedo, SLP

## 2025-02-19 ENCOUNTER — VIRTUAL VISIT (OUTPATIENT)
Dept: OTOLARYNGOLOGY | Facility: CLINIC | Age: 40
End: 2025-02-19
Payer: COMMERCIAL

## 2025-02-19 DIAGNOSIS — R49.0 DYSPHONIA: Primary | ICD-10-CM

## 2025-02-19 DIAGNOSIS — R09.A2 GLOBUS SENSATION: ICD-10-CM

## 2025-02-19 DIAGNOSIS — J38.7 LARYNGEAL HYPERFUNCTION: ICD-10-CM

## 2025-02-19 NOTE — LETTER
2/19/2025       RE: Dakota Corley  [address] [facility] [address]     Dear Colleague,    Thank you for referring your patient, Dakota Corley, to the [facility] VOICE CLINIC [place] at [facility]. Please see a copy of my visit note below.    Dakota Corley is a 39 year old male who is being evaluated via a billable video visit.      Dakota has been notified and verbally consented to the following:   This video visit will be conducted between you and your provider.  Patient has opted to conduct today's video visit vs an in-person appointment.   Video visits are billed at different rates depending on your insurance coverage. Please reach out to your insurance provider with any questions.   If during the course of the call the provider feels the appointment is not appropriate, you will not be charged for this service.  Provider has received verbal consent for billable virtual visit from the patient? Yes  Will anyone else be joining your video visit? No    Call initiated at: 1300  Type of Visit Platform Used: [employer]  Location of provider: Home  Location of patient: [facility] VOICE CLINIC  PROGRESS REPORT (CPT 01707)    Patient: Dakota Corley  Date of Service: 2/19/2025  Date of Last Service: 2/13/2025  Number of Visits: 12  Certification Dates: not needed  Referring Physician: self-referred    Impressions from evaluation on 9/12/24 by Dolly Zacarias (voice) MTerrenceSTerrence, CCC-SLP:  Dakota Corley is presenting today with Globus Sensation (R09.89) and Dysphonia (R49.0) in the context of Laryngeal Hyperfunction (J38.7) and imbalance in the intrinsic and extrinsic laryngeal musculature. Perceptually, Dakota demonstrates mild-moderately rough and strained voice quality corresponding with elevated AVQI, lower than optimal habitual frequency, non optimal respiratory mechanics, significant L>R perilaryngeal tension, and visible tension of the neck/upper body.  "Laryngoscopy with stroboscopy was completed and reveals good symmetry and overall normal mucosal wave and amplitude bilaterally. 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 Based on today's evaluation, Dakota would benefit from a course of speech therapy to improve laryngeal efficiency, reduce laryngeal irritation (mucus), improved respiratory mechanics and coordination with phonation, manual release strategies for perilaryngeal hyperfunction, and improve voice quality and balance of intrinsic/extrinsic laryngeal musculature. We will plan on 2 in person sessions to start, combined with virtual sessions. He [place] to drive [place] the [place] border for the virtual sessions.    SUBJECTIVE:  Since Dakota's last session, he reports the following:   He reports that symptoms have improved in the interval since our last appointment.  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  He notes that practicing his HEP has been going well.  CTP/TDP alternating exercises is challenging.  Extensive range with SOVTE's is rather comfortable.  He still experiences PND and a \"thick\" feeling.  Primary patient goal: Develop facility with TDP/CTP exercise and specifically with CTP.    OBJECTIVE:  Patient Specific Goal Metrics:      1/22/2025     1:00 PM 2/13/2025    12:00 PM 2/19/2025     1:00 PM   Dysponia SLP Goals   How would you rate your speaking voice quality, if 0 is worst voice quality, and 10 is best voice? 8 7 7   How would you rate your singing voice quality, if 0 is worst voice quality, and 10 is best voice? 8 7 6   How how severe is your [Throat Discomfort - or use patient specific description], if 0 is no [discomfort] at all and 10 is the worst [discomfort]? 3 5 5   How much does your throat discomfort bother you?     Somewhat " "Quite a bit Somewhat       THERAPEUTIC ACTIVITIES  Today Dakota participated in the following therapeutic activities:  Counseling and Education:  Asked questions about the nature of his symptoms, and I answered all of these thoroughly.    I provided Dakota with explanation and skilled instruction of:    All therapeutic exercises were completed chromatically and with a target accuracy of 80% unless otherwise noted.    The patient completed liptrills and labial-lingual trills to improve respiratory-phonatory coordination, to develop awareness of airflow during phonation, and to increase vocal range.  Patient used 4-5-7-5-4-3-2-1 on liptrills from C3 to E4 with minimal verbal, kinesthetic, and resonatory cues.  The patient noted, \"That felt perfect!\"  Patient used 7-7-3-5-4-3-2-1 on labial lingual trills from F4 to C5 with minimal verbal and kinesthetic cues.  Patient used 7-3-3-8-0-2-5-3-4-2-3-1-2-7-1 on labial lingual trills from C5 to F4 with minimal verbal, modeling, and kinesthetic cues.  Patient used 5-8-1-1-4-1-5-3-4-2-3-1-2-7-1 on liptrills from E4 to C3 with minimal verbal and kinesthetic cues.  The patient observed, \"It feels good!  Given how tired my voice felt coming into this session, hitting these notes went really well.\"  Patient produced Bb4 and higher were produced with TDP and without vocal strain.    The patient was instructed on the use of variegated pitch-vowel patterns in skilled vocal tasks to improve respiratory-phonatory coordination and to train vocal registration.  The patient completed 8-5-5-3-3-1 on /kev a i a i a/ from C3 to C5 (ceiling) to G3 with maximal verbal, modeling, and phonatory cues.  Verbal cues to inhale through maximally occluded lips were facilitating for decreased vocal strain.  Nares occlusion was facilitating for balance of CTP and TDP.     The patient was instructed on the use of alternating cricothyroid dominant voice production (CTP) and thyroarytenoid dominant voice " "production (TDP) to improve balance between vocal registers, to improve respiratory-phonatory coordination, and to increase comfortable vocal pitch range.  The patient completed 4-4-6-8-5-1-5 using alternating TDP and CTP on [a u a u a u a] with minimal verbal, modeling, and resonatory cues.  The patient observed, \"IT feels better than it has.\" And \"Focusing on the shape helped me get more to my voice.\"    The patient was instructed on the use of variegated pitch-vowel patterns in skilled vocal tasks to improve respiratory-phonatory coordination, to train vocal registration, and to increase vocal pitch range.  The patient used 5-4-3-2-1-3-5-8-5-3-1 on [hE hE hE hE she dawit she] from C3 to C5 with moderate verbal, resonatory, and modeling cues.  The patient observed, \"I am right at the edge of getting this concept entrenched in my mind.\" And \"I don't feel any discomfort.\"    Instruction of Home Practice:  A revised home exercise plan (HEP) was collaboratively designed and instructed.  The patient confirmed that the HEP was reasonable and that it seemed attainable for daily practice.  Patient will use a previously recorded HEP to facilitate home practice.    ASSESSMENT/PLAN  Progress toward long-term goals:  Adequate but incomplete progress; please see above    Impressions:  IMPRESSIONS: Globus Sensation (R09.89) and Dysphonia (R49.0) in the context of Laryngeal Hyperfunction (J38.7)    Dakota had a productive session of therapy today, working on continuing to increase the complexity (specifically pitch range) of his HEP. He reported that his discomfort completely resolved following completion of his modified HEP today. Given the patient's professional demands, future sessions should focus primarily on skilled voice use generalization tasks. He will continue to work on his exercises on a daily basis, and work on incorporating the techniques into his daily activities. Speech therapy continues to be medically necessary for " "Dakota to participate fully and engage in activities of daily living.     Plan:    I will see Dakota in 2 weeks (3/5/2025) during which time  we will plan to continue targeting balance between TDP and CTP, and coordination of respiration with phonation in increasingly complex vocationally relevant generalization tasks.    TOTAL SERVICE TIME: 55 minutes  TREATMENT ([id number])    Travis Acevedo M.M., Ph.D., CF-SLP  Clinical Fellow in Voice and Upper Airway Speech-Language Pathology  [facility] Centersatinder [email or web address]  Pronouns: he/him      OBJECTIVE FINDINGS  PATIENT REPORTED MEASURES  Patient Supplied Answers To Last 2 VHI Questionnaires      1/22/2025    12:28 PM 2/13/2025    11:01 AM   Voice Handicap Index (VHI-10)   My voice makes it difficult for people to hear me 1 2   People have difficulty understanding me in a noisy room 1 2   My voice difficulties restrict my personal and social life.  1 1   I feel left out of conversations because of my voice 1 1   My voice problem causes me to lose income 2 2   I feel as though I have to strain to produce voice 2 2   The clarity of my voice is unpredictable 2 2   My voice problem upsets me 3 3   My voice makes me feel handicapped 2 2   People ask, \"What's wrong with your voice?\" 0 1   VHI-10 15  18        Patient-reported          Patient Supplied Answers To Last 2 CSI Questionnaires      1/22/2025    12:28 PM 2/13/2025    11:01 AM   Cough Severity Index (CSI)   My cough is worse when I lie down 1 1   My coughing problem causes me to restrict my personal and social life 0 1   I tend to avoid places because of my cough problem 0 1   I feel embarrassed because of my coughing problem 0 1   People ask, ''What's wrong?'' because I cough a lot 0 0   I run out of air when I cough 0 0   My coughing problem affects my voice 2 2   My coughing problem limits my physical activity 0 0   My coughing problem upsets me 3 2   People ask me if I am sick " because I cough a lot 0 0   CSI Score 6  8        Patient-reported          Patient Supplied Answers To Last 2 EAT Questionnaires      10/16/2024    12:57 PM 2/13/2025    11:01 AM   Eating Assessment Tool (EAT-10)   My swallowing problem has caused me to lose weight 0 0   My swallowing problem interferes with my ability to go out for meals 0 0   Swallowing liquids takes extra effort 0 0   Swallowing solids takes extra effort 1 0   Swallowing pills takes extra effort 0 0   Swallowing is painful 1 0   The pleasure of eating is affected by my swallowing 1 0   When I swallow food sticks in my throat 1 2   I cough when I eat 0 1   Swallowing is stressful 1 1   EAT-10 5 4        Patient-reported         Patient Supplied Answers to Dyspnea Index Questionnaire:       No data to display                 Attestation signed by Toney Sanchez, SLP at 2/19/2025  2:58 PM:  I attest that these services performed under my supervision, and I agree with the information contained in within this note.    Toney Sanchez M.M., M.A., CCC-SLP  Speech-Language Pathologist  Certificate of Vocology  [phone]      Again, thank you for allowing me to participate in the care of your patient.      Sincerely,    Travis Acevedo, SLP

## 2025-02-19 NOTE — PROGRESS NOTES
Dakota Corley is a 39 year old male who is being evaluated via a billable video visit.      Dakota has been notified and verbally consented to the following:   This video visit will be conducted between you and your provider.  Patient has opted to conduct today's video visit vs an in-person appointment.   Video visits are billed at different rates depending on your insurance coverage. Please reach out to your insurance provider with any questions.   If during the course of the call the provider feels the appointment is not appropriate, you will not be charged for this service.  Provider has received verbal consent for billable virtual visit from the patient? Yes  Will anyone else be joining your video visit? No    Call initiated at: 1300  Type of Visit Platform Used: [employer] Video  Location of provider: Home  Location of patient: [facility]  PROGRESS REPORT (CPT 74315)    Patient: Dakota Corley  Date of Service: 2/19/2025  Date of Last Service: 2/13/2025  Number of Visits: 12  Certification Dates: not needed  Referring Physician: self-referred    Impressions from evaluation on 9/12/24 by Dolly Zacarias (voice), M.S., CCC-SLP:  Dakota Corley is presenting today with Globus Sensation (R09.89) and Dysphonia (R49.0) in the context of Laryngeal Hyperfunction (J38.7) and imbalance in the intrinsic and extrinsic laryngeal musculature. Perceptually, Dakota demonstrates mild-moderately rough and strained voice quality corresponding with elevated AVQI, lower than optimal habitual frequency, non optimal respiratory mechanics, significant L>R perilaryngeal tension, and visible tension of the neck/upper body. Laryngoscopy with stroboscopy was completed and reveals good symmetry and overall normal mucosal wave and amplitude bilaterally. 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 Based on today's evaluation, Dakota would benefit  "from a course of speech therapy to improve laryngeal efficiency, reduce laryngeal irritation (mucus), improved respiratory mechanics and coordination with phonation, manual release strategies for perilaryngeal hyperfunction, and improve voice quality and balance of intrinsic/extrinsic laryngeal musculature. We will plan on 2 in person sessions to start, combined with virtual sessions. He [place] to drive [place] the [place] border for the virtual sessions.    SUBJECTIVE:  Since Dakota's last session, he reports the following:   He reports that symptoms have improved in the interval since our last appointment.  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  He notes that practicing his HEP has been going well.  CTP/TDP alternating exercises is challenging.  Extensive range with SOVTE's is rather comfortable.  He still experiences PND and a \"thick\" feeling.  Primary patient goal: Develop facility with TDP/CTP exercise and specifically with CTP.    OBJECTIVE:  Patient Specific Goal Metrics:      1/22/2025     1:00 PM 2/13/2025    12:00 PM 2/19/2025     1:00 PM   Dysponia SLP Goals   How would you rate your speaking voice quality, if 0 is worst voice quality, and 10 is best voice? 8 7 7   How would you rate your singing voice quality, if 0 is worst voice quality, and 10 is best voice? 8 7 6   How how severe is your [Throat Discomfort - or use patient specific description], if 0 is no [discomfort] at all and 10 is the worst [discomfort]? 3 5 5   How much does your throat discomfort bother you?     Somewhat Quite a bit Somewhat       THERAPEUTIC ACTIVITIES  Today Dakota participated in the following therapeutic activities:  Counseling and Education:  Asked questions about the nature of his symptoms, and I answered all of these thoroughly.    I provided Dakota with explanation and skilled instruction of:    All " "therapeutic exercises were completed chromatically and with a target accuracy of 80% unless otherwise noted.    The patient completed liptrills and labial-lingual trills to improve respiratory-phonatory coordination, to develop awareness of airflow during phonation, and to increase vocal range.  Patient used 5-7-5-5-4-3-2-1 on liptrills from C3 to E4 with minimal verbal, kinesthetic, and resonatory cues.  The patient noted, \"That felt perfect!\"  Patient used 5-5-9-5-4-3-2-1 on labial lingual trills from F4 to C5 with minimal verbal and kinesthetic cues.  Patient used 9-2-2-8-9-1-5-3-4-2-3-1-2-7-1 on labial lingual trills from C5 to F4 with minimal verbal, modeling, and kinesthetic cues.  Patient used 9-1-0-9-1-9-5-3-4-2-3-1-2-7-1 on liptrills from E4 to C3 with minimal verbal and kinesthetic cues.  The patient observed, \"It feels good!  Given how tired my voice felt coming into this session, hitting these notes went really well.\"  Patient produced Bb4 and higher were produced with TDP and without vocal strain.    The patient was instructed on the use of variegated pitch-vowel patterns in skilled vocal tasks to improve respiratory-phonatory coordination and to train vocal registration.  The patient completed 8-5-5-3-3-1 on /kev a i a i a/ from C3 to C5 (ceiling) to G3 with maximal verbal, modeling, and phonatory cues.  Verbal cues to inhale through maximally occluded lips were facilitating for decreased vocal strain.  Nares occlusion was facilitating for balance of CTP and TDP.     The patient was instructed on the use of alternating cricothyroid dominant voice production (CTP) and thyroarytenoid dominant voice production (TDP) to improve balance between vocal registers, to improve respiratory-phonatory coordination, and to increase comfortable vocal pitch range.  The patient completed 4-8-8-8-5-1-5 using alternating TDP and CTP on [a u a u a u a] with minimal verbal, modeling, and resonatory cues.  The patient " "observed, \"IT feels better than it has.\" And \"Focusing on the shape helped me get more to my voice.\"    The patient was instructed on the use of variegated pitch-vowel patterns in skilled vocal tasks to improve respiratory-phonatory coordination, to train vocal registration, and to increase vocal pitch range.  The patient used 5-4-3-2-1-3-5-8-5-3-1 on [hE hE hE hE she dawit she] from C3 to C5 with moderate verbal, resonatory, and modeling cues.  The patient observed, \"I am right at the edge of getting this concept entrenched in my mind.\" And \"I don't feel any discomfort.\"    Instruction of Home Practice:  A revised home exercise plan (HEP) was collaboratively designed and instructed.  The patient confirmed that the HEP was reasonable and that it seemed attainable for daily practice.  Patient will use a previously recorded HEP to facilitate home practice.    ASSESSMENT/PLAN  Progress toward long-term goals:  Adequate but incomplete progress; please see above    Impressions:  IMPRESSIONS: Globus Sensation (R09.89) and Dysphonia (R49.0) in the context of Laryngeal Hyperfunction (J38.7)    Dakota had a productive session of therapy today, working on continuing to increase the complexity (specifically pitch range) of his HEP. He reported that his discomfort completely resolved following completion of his modified HEP today. Given the patient's professional demands, future sessions should focus primarily on skilled voice use generalization tasks. He will continue to work on his exercises on a daily basis, and work on incorporating the techniques into his daily activities. Speech therapy continues to be medically necessary for Dakota to participate fully and engage in activities of daily living.     Plan:    I will see Dakota in 2 weeks (3/5/2025) during which time  we will plan to continue targeting balance between TDP and CTP, and coordination of respiration with phonation in increasingly complex vocationally relevant " "generalization tasks.    TOTAL SERVICE TIME: 55 minutes  TREATMENT ([id number])    Travis Acevedo M.M., Ph.D., CF-SLP  Clinical Fellow in Voice and Upper Airway Speech-Language Pathology  [facility]  [email or web address]  Pronouns: he/him      OBJECTIVE FINDINGS  PATIENT REPORTED MEASURES  Patient Supplied Answers To Last 2 VHI Questionnaires      1/22/2025    12:28 PM 2/13/2025    11:01 AM   Voice Handicap Index (VHI-10)   My voice makes it difficult for people to hear me 1 2   People have difficulty understanding me in a noisy room 1 2   My voice difficulties restrict my personal and social life.  1 1   I feel left out of conversations because of my voice 1 1   My voice problem causes me to lose income 2 2   I feel as though I have to strain to produce voice 2 2   The clarity of my voice is unpredictable 2 2   My voice problem upsets me 3 3   My voice makes me feel handicapped 2 2   People ask, \"What's wrong with your voice?\" 0 1   VHI-10 15  18        Patient-reported          Patient Supplied Answers To Last 2 CSI Questionnaires      1/22/2025    12:28 PM 2/13/2025    11:01 AM   Cough Severity Index (CSI)   My cough is worse when I lie down 1 1   My coughing problem causes me to restrict my personal and social life 0 1   I tend to avoid places because of my cough problem 0 1   I feel embarrassed because of my coughing problem 0 1   People ask, ''What's wrong?'' because I cough a lot 0 0   I run out of air when I cough 0 0   My coughing problem affects my voice 2 2   My coughing problem limits my physical activity 0 0   My coughing problem upsets me 3 2   People ask me if I am sick because I cough a lot 0 0   CSI Score 6  8        Patient-reported          Patient Supplied Answers To Last 2 EAT Questionnaires      10/16/2024    12:57 PM 2/13/2025    11:01 AM   Eating Assessment Tool (EAT-10)   My swallowing problem has caused me to lose weight 0 0   My swallowing problem " interferes with my ability to go out for meals 0 0   Swallowing liquids takes extra effort 0 0   Swallowing solids takes extra effort 1 0   Swallowing pills takes extra effort 0 0   Swallowing is painful 1 0   The pleasure of eating is affected by my swallowing 1 0   When I swallow food sticks in my throat 1 2   I cough when I eat 0 1   Swallowing is stressful 1 1   EAT-10 5 4        Patient-reported         Patient Supplied Answers to Dyspnea Index Questionnaire:       No data to display

## 2025-03-24 ENCOUNTER — VIRTUAL VISIT (OUTPATIENT)
Dept: OTOLARYNGOLOGY | Facility: CLINIC | Age: 40
End: 2025-03-24
Payer: COMMERCIAL

## 2025-03-24 DIAGNOSIS — R49.0 DYSPHONIA: Primary | ICD-10-CM

## 2025-03-24 DIAGNOSIS — J38.7 LARYNGEAL HYPERFUNCTION: ICD-10-CM

## 2025-03-24 DIAGNOSIS — R09.A2 GLOBUS SENSATION: ICD-10-CM

## 2025-03-24 PROCEDURE — 92507 TX SP LANG VOICE COMM INDIV: CPT | Mod: GN

## 2025-03-24 PROCEDURE — 99207 PR NO CHARGE LOS: CPT | Mod: GN

## 2025-03-24 NOTE — Clinical Note
3/24/2025       RE: Dakota Corley  [address] [address]     Dear Colleague,    Thank you for referring your patient, Dakota Corley, to the [facility] VOICE [facility] [place] [facility]. Please see a copy of my visit note below.    Dakota Corley is a 39 year old male who is being evaluated via a billable video visit.      Dakota has been notified and verbally consented to the following:   This video visit will be conducted between you and your provider.  Patient has opted to conduct today's video visit vs an in-person appointment.   Video visits are billed at different rates depending on your insurance coverage. Please reach out to your insurance provider with any questions.   If during the course of the call the provider feels the appointment is not appropriate, you will not be charged for this service.  Provider has received verbal consent for billable virtual visit from the patient? Yes  Will anyone else be joining your video visit? No    Call initiated at: 0905  Type of Visit Platform Used: [employer] Video  Location of provider: [facility]  Location of patient: [facility] VOICE CLINIC  PROGRESS REPORT (CPT 88116)    Patient: Dakota Corley  Date of Service: 3/24/2025  Date of Last Service: 3/5/2025  Number of Visits: 14  Certification Dates: not needed  Referring Physician: self-referred    Impressions from evaluation on 9/12/24 by Dolly Zacarias (voice), M.S., CCC-SLP:  Dakota Corley is presenting today with Globus Sensation (R09.89) and Dysphonia (R49.0) in the context of Laryngeal Hyperfunction (J38.7) and imbalance in the intrinsic and extrinsic laryngeal musculature. Perceptually, Dakota demonstrates mild-moderately rough and strained voice quality corresponding with elevated AVQI, lower than optimal habitual frequency, non optimal respiratory mechanics, significant L>R perilaryngeal tension, and visible tension of the  INFORMED CONSENT-- circumcision, Dr. Jacome  The risks, benefits and alternatives of the procedure were discussed with the patient's mother, including the risks of bleeding, infection. She understands and agrees with the plan.     neck/upper body. Laryngoscopy with stroboscopy was completed and reveals good symmetry and overall normal mucosal wave and amplitude bilaterally. 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 Based on today's evaluation, Dakota would benefit from a course of speech therapy to improve laryngeal efficiency, reduce laryngeal irritation (mucus), improved respiratory mechanics and coordination with phonation, manual release strategies for perilaryngeal hyperfunction, and improve voice quality and balance of intrinsic/extrinsic laryngeal musculature. We will plan on 2 in person sessions to start, combined with virtual sessions. He agreed to drive [place] the [place] border for the virtual sessions.    SUBJECTIVE:  Since Dakota's last session, he reports the following:   He reports that symptoms have improved in the interval since our last appointment.  He has been practicing an extended pitch range lately  He is getting  this week.  He notes that practicing his HEP has been going well.  Primary patient goal: Reduce strain on professionally relevant generalization tasks.    OBJECTIVE:  Patient Specific Goal Metrics:      2/19/2025     1:00 PM 3/5/2025    10:00 AM 3/24/2025     9:00 AM   Dysponia SLP Goals   How would you rate your speaking voice quality, if 0 is worst voice quality, and 10 is best voice? 7 8 6   How would you rate your singing voice quality, if 0 is worst voice quality, and 10 is best voice? 6 8 6   How how severe is your [Throat Discomfort - or use patient specific description], if 0 is no [discomfort] at all and 10 is the worst [discomfort]? 5 2 3   How much does your throat discomfort bother you?     Somewhat A little bit Somewhat       THERAPEUTIC ACTIVITIES  Today Dakota participated in the following therapeutic activities:  Counseling and Education:  Asked questions about the nature of his symptoms, and I answered all  "of these thoroughly.  Provided Pain Neuroscience Education     I provided Dakota with explanation and skilled instruction of:    All therapeutic exercises were completed chromatically and with a target accuracy of 80% unless otherwise noted.    The patient completed liptrills and labial-lingual trills to improve respiratory-phonatory coordination, to develop awareness of airflow during phonation, and to increase vocal range.  Patient used 9-6-2-5-4-3-2-1 on liptrills from C3 to E4 with minimal verbal and resonatory cues.  Patient used 9-2-0-5-4-3-2-1 on labial lingual trills from F4 to C5 with minimal verbal and respiratory cues.  Patient used 6-6-7-8-7-9-5-3-4-2-3-1-2-7-1 on labial lingual trills from C5 to F4 with minimal verbal and respiratory cues.  The patient noted, \"If I go into falsetto, it is easier to start the sound.\"  Patient used 1-3-1-7-3-6-5-3-4-2-3-1-2-7-1 on liptrills from E4 to C3 with minimal verbal and resonatory cues.    The patient was instructed on the use of variegated pitch-vowel patterns in skilled vocal tasks to improve respiratory-phonatory coordination and to train vocal registration.  Patient used 0-7-5-5-4-3-2-1 on /dawit dawit dawit dawit she she she she/ from C3 to Ab4 to C3 with moderate verbal, resonatory, and articulatory cues.  Cervical extension was facilitating for decreased vocal strain and increased vocal pitch range.     The patient was instructed on the use of variegated pitch-vowel patterns in skilled vocal tasks to improve respiratory-phonatory coordination and to train vocal registration.  The patient completed 8-5-5-3-3-1 on /kev a i a i a/ from C3 to B4 (ceiling) to C3 with moderate verbal, resonatory, kinesthetic, and phonatory cues.  Nares occlusion was facilitating for decreased vocal strain.  Patient demonstrated vibrato on sustained pitches.    The patient was instructed in the context of professionally relevant generalization tasks (skilled vocal exercises with moderate " "pitch and loudness variation) with moderate verbal, modeling, and resonatory cues.  The patient sang \"About to Come Alive\" by Train with an approximate range of G3 to G4.  The patient remarked, \"It felt tighter, but it didn't hurt.  I think I was just protecting.\"    The following exercise was used in the past and is still a component of the patient's HEP:     The patient was instructed on the use of alternating cricothyroid dominant voice production (CTP) and thyroarytenoid dominant voice production (TDP) to improve balance between vocal registers, to improve respiratory-phonatory coordination, and to increase comfortable vocal pitch range.  The patient completed 5-6-1-8-5-1-5 using alternating TDP and CTP on [a u a u a u a] from Ab3 to Bb4 to Db3 with minimal verbal, modeling, and resonatory cues.  The patient observed, \"What jumped out to me was that some of the mid to mid high falsetto was clearer and .\"    The patient was instructed on the use of variegated pitch-vowel patterns in skilled vocal tasks to improve respiratory-phonatory coordination, to train vocal registration, and to increase vocal pitch range.  The patient used 5-4-3-2-1-3-5-8-5-3-1 on [hE hE hE hE she dawit she] from C3 to C5 with moderate verbal, resonatory, and modeling cues.  The patient observed, \"I am right at the edge of getting this concept entrenched in my mind.\" And \"I don't feel any discomfort.\"    Instruction of Home Practice:  A revised home exercise plan (HEP) was collaboratively designed and instructed.  The patient confirmed that the HEP was reasonable and that it seemed attainable for daily practice.  Patient will use a previously recorded HEP to facilitate home practice.    ASSESSMENT/PLAN  Progress toward long-term goals:  Adequate but incomplete progress; please see above    Impressions:  IMPRESSIONS: Globus Sensation (R09.89) and Dysphonia (R49.0) in the context of Laryngeal Hyperfunction (J38.7)    Dakota had a " productive session of therapy today, working on continuing to increase the complexity of his HEP with attention to generalization tasks. 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 He is increasingly able to complete professional skilled voice activities without pain/discomfort and with a high degree of accuracy.  As such, his ability to maintain these improvements over the next 5 weeks with no additional voice therapy appointments will be of paramount importance to his prognosis.  We have discussed referral to a qualified singing  in the near future. He will continue to work on his exercises on a daily basis, and work on incorporating the techniques into his daily activities. Speech therapy continues to be medically necessary for Dakota to participate fully and engage in activities of daily living.     Plan:    I will see Dakota in 5 week (5/7/2025) during which time  we will plan to continue targeting balance between TDP and CTP, and coordination of respiration with phonation in increasingly complex vocationally relevant generalization tasks.    TOTAL SERVICE TIME: 50 minutes  TREATMENT (72913)    Travis Acevedo M.M., Ph.D., CF-SLP  Clinical Fellow in Voice and Upper Airway Speech-Language Pathology  [facility]  [email or web address]  Pronouns: he/him      OBJECTIVE FINDINGS  PATIENT REPORTED MEASURES  Patient Supplied Answers To Last 2 VHI Questionnaires      2/13/2025    11:01 AM 3/5/2025     9:47 AM   Voice Handicap Index (VHI-10)   My voice makes it difficult for people to hear me 2 2   People have difficulty understanding me in a noisy room 2 2   My voice difficulties restrict my personal and social life.  1 2   I feel left out of conversations because of my voice 1 1   My voice problem causes me to lose income 2 3   I feel as  "though I have to strain to produce voice 2 2   The clarity of my voice is unpredictable 2 2   My voice problem upsets me 3 3   My voice makes me feel handicapped 2 2   People ask, \"What's wrong with your voice?\" 1 1   VHI-10 18  20        Patient-reported          Patient Supplied Answers To Last 2 CSI Questionnaires      1/22/2025    12:28 PM 2/13/2025    11:01 AM   Cough Severity Index (CSI)   My cough is worse when I lie down 1 1   My coughing problem causes me to restrict my personal and social life 0 1   I tend to avoid places because of my cough problem 0 1   I feel embarrassed because of my coughing problem 0 1   People ask, ''What's wrong?'' because I cough a lot 0 0   I run out of air when I cough 0 0   My coughing problem affects my voice 2 2   My coughing problem limits my physical activity 0 0   My coughing problem upsets me 3 2   People ask me if I am sick because I cough a lot 0 0   CSI Score 6  8        Patient-reported          Patient Supplied Answers To Last 2 EAT Questionnaires      10/16/2024    12:57 PM 2/13/2025    11:01 AM   Eating Assessment Tool (EAT-10)   My swallowing problem has caused me to lose weight 0 0   My swallowing problem interferes with my ability to go out for meals 0 0   Swallowing liquids takes extra effort 0 0   Swallowing solids takes extra effort 1 0   Swallowing pills takes extra effort 0 0   Swallowing is painful 1 0   The pleasure of eating is affected by my swallowing 1 0   When I swallow food sticks in my throat 1 2   I cough when I eat 0 1   Swallowing is stressful 1 1   EAT-10 5 4        Patient-reported         Patient Supplied Answers to Dyspnea Index Questionnaire:       No data to display                   Again, thank you for allowing me to participate in the care of your patient.      Sincerely,    Travis Acevedo, SLP    "

## 2025-03-24 NOTE — PROGRESS NOTES
Dakota Corley is a 39 year old male who is being evaluated via a billable video visit.      Dakota has been notified and verbally consented to the following:   This video visit will be conducted between you and your provider.  Patient has opted to conduct today's video visit vs an in-person appointment.   Video visits are billed at different rates depending on your insurance coverage. Please reach out to your insurance provider with any questions.   If during the course of the call the provider feels the appointment is not appropriate, you will not be charged for this service.  Provider has received verbal consent for billable virtual visit from the patient? Yes  Will anyone else be joining your video visit? No    Call initiated at: 0905  Type of Visit Platform Used: [employer] Video  Location of provider: [facility]  Location of patient: [facility]  PROGRESS REPORT (CPT 88272)    Patient: Dakota Corley  Date of Service: 3/24/2025  Date of Last Service: 3/5/2025  Number of Visits: 14  Certification Dates: not needed  Referring Physician: self-referred    Impressions from evaluation on 9/12/24 by Dolly Zacarias (voice), M.S., CCC-SLP:  Dakota Corley is presenting today with Globus Sensation (R09.89) and Dysphonia (R49.0) in the context of Laryngeal Hyperfunction (J38.7) and imbalance in the intrinsic and extrinsic laryngeal musculature. Perceptually, Dakota demonstrates mild-moderately rough and strained voice quality corresponding with elevated AVQI, lower than optimal habitual frequency, non optimal respiratory mechanics, significant L>R perilaryngeal tension, and visible tension of the neck/upper body. Laryngoscopy with stroboscopy was completed and reveals good symmetry and overall normal mucosal wave and amplitude bilaterally. 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 Based on today's evaluation,  Dakota would benefit from a course of speech therapy to improve laryngeal efficiency, reduce laryngeal irritation (mucus), improved respiratory mechanics and coordination with phonation, manual release strategies for perilaryngeal hyperfunction, and improve voice quality and balance of intrinsic/extrinsic laryngeal musculature. We will plan on 2 in person sessions to start, combined with virtual sessions. He agreed to drive across the [place] border for the virtual sessions.    SUBJECTIVE:  Since Dakota's last session, he reports the following:   He reports that symptoms have improved in the interval since our last appointment.  He has been practicing an extended pitch range lately  He is getting  this week.  He notes that practicing his HEP has been going well.  Primary patient goal: Reduce strain on professionally relevant generalization tasks.    OBJECTIVE:  Patient Specific Goal Metrics:      2/19/2025     1:00 PM 3/5/2025    10:00 AM 3/24/2025     9:00 AM   Dysponia SLP Goals   How would you rate your speaking voice quality, if 0 is worst voice quality, and 10 is best voice? 7 8 6   How would you rate your singing voice quality, if 0 is worst voice quality, and 10 is best voice? 6 8 6   How how severe is your [Throat Discomfort - or use patient specific description], if 0 is no [discomfort] at all and 10 is the worst [discomfort]? 5 2 3   How much does your throat discomfort bother you?     Somewhat A little bit Somewhat       THERAPEUTIC ACTIVITIES  Today Dakota participated in the following therapeutic activities:  Counseling and Education:  Asked questions about the nature of his symptoms, and I answered all of these thoroughly.  Provided Pain Neuroscience Education     I provided Dakota with explanation and skilled instruction of:    All therapeutic exercises were completed chromatically and with a target accuracy of 80% unless otherwise noted.    The patient completed liptrills and labial-lingual trills to  "improve respiratory-phonatory coordination, to develop awareness of airflow during phonation, and to increase vocal range.  Patient used 6-4-8-5-4-3-2-1 on liptrills from C3 to E4 with minimal verbal and resonatory cues.  Patient used 9-0-0-5-4-3-2-1 on labial lingual trills from F4 to C5 with minimal verbal and respiratory cues.  Patient used 6-0-7-9-9-4-5-3-4-2-3-1-2-7-1 on labial lingual trills from C5 to F4 with minimal verbal and respiratory cues.  The patient noted, \"If I go into falsetto, it is easier to start the sound.\"  Patient used 3-6-0-0-9-1-5-3-4-2-3-1-2-7-1 on liptrills from E4 to C3 with minimal verbal and resonatory cues.    The patient was instructed on the use of variegated pitch-vowel patterns in skilled vocal tasks to improve respiratory-phonatory coordination and to train vocal registration.  Patient used 0-7-0-5-4-3-2-1 on /dawit dawit dawit dawit she she she she/ from C3 to Ab4 to C3 with moderate verbal, resonatory, and articulatory cues.  Cervical extension was facilitating for decreased vocal strain and increased vocal pitch range.     The patient was instructed on the use of variegated pitch-vowel patterns in skilled vocal tasks to improve respiratory-phonatory coordination and to train vocal registration.  The patient completed 8-5-5-3-3-1 on /kev a i a i a/ from C3 to B4 (ceiling) to C3 with moderate verbal, resonatory, kinesthetic, and phonatory cues.  Nares occlusion was facilitating for decreased vocal strain.  Patient demonstrated vibrato on sustained pitches.    The patient was instructed in the context of professionally relevant generalization tasks (skilled vocal exercises with moderate pitch and loudness variation) with moderate verbal, modeling, and resonatory cues.  The patient sang \"About to Come Alive\" by Train with an approximate range of G3 to G4.  The patient remarked, \"It felt tighter, but it didn't hurt.  I think I was just protecting.\"    The following exercise was used in the " "past and is still a component of the patient's HEP:     The patient was instructed on the use of alternating cricothyroid dominant voice production (CTP) and thyroarytenoid dominant voice production (TDP) to improve balance between vocal registers, to improve respiratory-phonatory coordination, and to increase comfortable vocal pitch range.  The patient completed 6-9-7-8-5-1-5 using alternating TDP and CTP on [a u a u a u a] from Ab3 to Bb4 to Db3 with minimal verbal, modeling, and resonatory cues.  The patient observed, \"What jumped out to me was that some of the mid to mid high falsetto was clearer and .\"    The patient was instructed on the use of variegated pitch-vowel patterns in skilled vocal tasks to improve respiratory-phonatory coordination, to train vocal registration, and to increase vocal pitch range.  The patient used 5-4-3-2-1-3-5-8-5-3-1 on [hE hE hE hE she dawit she] from C3 to C5 with moderate verbal, resonatory, and modeling cues.  The patient observed, \"I am right at the edge of getting this concept entrenched in my mind.\" And \"I don't feel any discomfort.\"    Instruction of Home Practice:  A revised home exercise plan (HEP) was collaboratively designed and instructed.  The patient confirmed that the HEP was reasonable and that it seemed attainable for daily practice.  Patient will use a previously recorded HEP to facilitate home practice.    ASSESSMENT/PLAN  Progress toward long-term goals:  Adequate but incomplete progress; please see above    Impressions:  IMPRESSIONS: Globus Sensation (R09.89) and Dysphonia (R49.0) in the context of Laryngeal Hyperfunction (J38.7)    Dakota had a productive session of therapy today, working on continuing to increase the complexity of his HEP with attention to generalization tasks. 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 " "He is increasingly able to complete professional skilled voice activities without pain/discomfort and with a high degree of accuracy.  As such, his ability to maintain these improvements over the next 5 weeks with no additional voice therapy appointments will be of paramount importance to his prognosis.  We have discussed referral to a qualified singing  in the near future. He will continue to work on his exercises on a daily basis, and work on incorporating the techniques into his daily activities. Speech therapy continues to be medically necessary for Dakota to participate fully and engage in activities of daily living.     Plan:    I will see Dakota in 5 week (5/7/2025) during which time  we will plan to continue targeting balance between TDP and CTP, and coordination of respiration with phonation in increasingly complex vocationally relevant generalization tasks.    TOTAL SERVICE TIME: 50 minutes  TREATMENT ([id number])    Travis Acevedo M.M., Ph.D., CF-SLP  Clinical Fellow in Voice and Upper Airway Speech-Language Pathology  [facility]  [email or web address]  Pronouns: he/him      OBJECTIVE FINDINGS  PATIENT REPORTED MEASURES  Patient Supplied Answers To Last 2 VHI Questionnaires      2/13/2025    11:01 AM 3/5/2025     9:47 AM   Voice Handicap Index (VHI-10)   My voice makes it difficult for people to hear me 2 2   People have difficulty understanding me in a noisy room 2 2   My voice difficulties restrict my personal and social life.  1 2   I feel left out of conversations because of my voice 1 1   My voice problem causes me to lose income 2 3   I feel as though I have to strain to produce voice 2 2   The clarity of my voice is unpredictable 2 2   My voice problem upsets me 3 3   My voice makes me feel handicapped 2 2   People ask, \"What's wrong with your voice?\" 1 1   VHI-10 18  20        Patient-reported          Patient Supplied Answers To Last 2 CSI " Questionnaires      1/22/2025    12:28 PM 2/13/2025    11:01 AM   Cough Severity Index (CSI)   My cough is worse when I lie down 1 1   My coughing problem causes me to restrict my personal and social life 0 1   I tend to avoid places because of my cough problem 0 1   I feel embarrassed because of my coughing problem 0 1   People ask, ''What's wrong?'' because I cough a lot 0 0   I run out of air when I cough 0 0   My coughing problem affects my voice 2 2   My coughing problem limits my physical activity 0 0   My coughing problem upsets me 3 2   People ask me if I am sick because I cough a lot 0 0   CSI Score 6  8        Patient-reported          Patient Supplied Answers To Last 2 EAT Questionnaires      10/16/2024    12:57 PM 2/13/2025    11:01 AM   Eating Assessment Tool (EAT-10)   My swallowing problem has caused me to lose weight 0 0   My swallowing problem interferes with my ability to go out for meals 0 0   Swallowing liquids takes extra effort 0 0   Swallowing solids takes extra effort 1 0   Swallowing pills takes extra effort 0 0   Swallowing is painful 1 0   The pleasure of eating is affected by my swallowing 1 0   When I swallow food sticks in my throat 1 2   I cough when I eat 0 1   Swallowing is stressful 1 1   EAT-10 5 4        Patient-reported         Patient Supplied Answers to Dyspnea Index Questionnaire:       No data to display

## 2025-05-07 ENCOUNTER — VIRTUAL VISIT (OUTPATIENT)
Dept: OTOLARYNGOLOGY | Facility: CLINIC | Age: 40
End: 2025-05-07
Payer: COMMERCIAL

## 2025-05-07 DIAGNOSIS — R09.A2 GLOBUS SENSATION: ICD-10-CM

## 2025-05-07 DIAGNOSIS — J38.7 LARYNGEAL HYPERFUNCTION: ICD-10-CM

## 2025-05-07 DIAGNOSIS — R49.0 DYSPHONIA: Primary | ICD-10-CM

## 2025-05-07 NOTE — PATIENT INSTRUCTIONS
It was great working with you today!  Keep using your previously recorded exercises, but for the falsetto to full voice exercise keep the following in mind:    -The exercise is two full octaves starting on B4 (B above middle C).    -Start on /shoo/ and in pure falsetto.  If you have trouble singing in falsetto, be sure to round your lips like you are blowing out a candle (rather than keeping your lips horizontally spread).   -Continue down the scale on /shoo/ in falsetto until the fifth note, where you should switch to full voice.    -Continue down all the way to B2.    Then, sing the same scale but starting on C5.  Then, come back down to B4.    That's it!  Try this out for 1-2 weeks and see if falsetto becomes smoother and easier.    Let me know if you have any questions!    Best,    Ian Acevedo M.M., Ph.D., CF-SLP  Clinical Fellow in Voice and Upper Airway Speech-Language Pathology  [facility] Centersatinder [email or web address]  Pronouns: he/him

## 2025-05-07 NOTE — LETTER
5/7/2025       RE: Dakota Corley  [address] [facility] [address]     Dear Colleague,    Thank you for referring your patient, Dakota Corley, to the [facility] at [facility]. Please see a copy of my visit note below.    Dakota Corley is a 39 year old male who is being evaluated via a billable video visit.      Dakota has been notified and verbally consented to the following:   This video visit will be conducted between you and your provider.  Patient has opted to conduct today's video visit vs an in-person appointment.   Video visits are billed at different rates depending on your insurance coverage. Please reach out to your insurance provider with any questions.   If during the course of the call the provider feels the appointment is not appropriate, you will not be charged for this service.  Provider has received verbal consent for billable virtual visit from the patient? Yes  Will anyone else be joining your video visit? No    Call initiated at: [phone]  Type of Visit Platform Used: TMS  Location of provider: Home  Location of patient: [facility]  PROGRESS REPORT (CPT 70593)    Patient: Dakota Corley  Date of Service: 5/7/2025  Date of Last Service: 3/24/2025  Number of Visits: 15  Certification Dates: not needed  Referring Physician: self-referred    Impressions from evaluation on 9/12/24 by Dolly Zacarias (voice) M.S., CCC-SLP:  Dakota Corley is presenting today with Globus Sensation (R09.89) and Dysphonia (R49.0) in the context of Laryngeal Hyperfunction (J38.7) and imbalance in the intrinsic and extrinsic laryngeal musculature. Perceptually, Dakota demonstrates mild-moderately rough and strained voice quality corresponding with elevated AVQI, lower than optimal habitual frequency, non optimal respiratory mechanics, significant L>R perilaryngeal tension, and visible tension of the neck/upper body.  Laryngoscopy with stroboscopy was completed and reveals good symmetry and overall normal mucosal wave and amplitude bilaterally. 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 Based on today's evaluation, Dakota would benefit from a course of speech therapy to improve laryngeal efficiency, reduce laryngeal irritation (mucus), improved respiratory mechanics and coordination with phonation, manual release strategies for perilaryngeal hyperfunction, and improve voice quality and balance of intrinsic/extrinsic laryngeal musculature. We will plan on 2 in person sessions to start, combined with virtual sessions. He agreed to drive [place] the [place] border for the virtual sessions.    SUBJECTIVE:  Since Dakota's last session, he reports the following:   He reports that symptoms have improved in the interval since our last appointment.  He has noticed improved ability to sing higher pitches when he doesn't think about singing high.  He was able to sing in falsetto more easily lately.  He sang at his wedding for his wife recently.  He had a rehearsal last night that went long.  He feels discomfort inside his larynx today, possibly associated with increased reflux symptoms.  He notes that practicing his HEP has been going well. He just returned to practicing recently.  Primary patient goal: Assess current range and figure out what to improve upon next.    OBJECTIVE:  Patient Specific Goal Metrics:      3/5/2025    10:00 AM 3/24/2025     9:00 AM 5/7/2025     3:00 PM   Dysponia SLP Goals   How would you rate your speaking voice quality, if 0 is worst voice quality, and 10 is best voice? 8 6 7   How would you rate your singing voice quality, if 0 is worst voice quality, and 10 is best voice? 8 6 7   How how severe is your [Throat Discomfort - or use patient specific description], if 0 is no [discomfort] at all and 10 is the worst [discomfort]? 2 3 5  "  How much does your throat discomfort bother you?     A little bit Somewhat Somewhat       THERAPEUTIC ACTIVITIES  Today Dakota participated in the following therapeutic activities:  Counseling and Education:  Asked questions about the nature of his symptoms, and I answered all of these thoroughly.  Provided Pain Neuroscience Education     I provided Dakota with explanation and skilled instruction of:    All therapeutic exercises were completed chromatically and with a target accuracy of 80% unless otherwise noted.    The patient completed liptrills and labial-lingual trills to improve respiratory-phonatory coordination, to develop awareness of airflow during phonation, and to increase vocal range.  Patient used 0-1-8-5-4-3-2-1 on liptrills from C3 to E4 with minimal verbal, kinesthetic, and resonatory cues.  Nares occlusion was facilitating for decreased vocal strain.  Patient used 7-1-0-5-4-3-2-1 on labial lingual trills from F4 to C5 with minimal verbal, respiratory, and kinesthetic cues.  Nares occlusion was facilitating for decreased vocal strain.  Patient used 1-0-1-5-9-8-5-3-4-2-3-1-2-7-1 on labial lingual trills from C5 to F4 with minimal verbal and respiratory cues.  Patient used 2-4-8-3-9-2-5-3-4-2-3-1-2-7-1 on liptrills from E4 to C3 with minimal verbal and resonatory cues.  The patient noted, \"When I'm holding my nose, it was very very obvious [when it was forward or not].\"    The patient was instructed on the use of variegated pitch-vowel patterns in skilled vocal tasks to improve respiratory-phonatory coordination and to train vocal registration.  Patient used 7-0-4-5-4-3-2-1 on /dawit adwit dawit dawit she she she she/ from C3 to Ab4 to C3 with maximal verbal, resonatory, and articulatory cues.  Maximal inhalation through occluded lips was facilitating for decreased vocal strain.    The patient learned the use of alternating isometric contraction of the CT and TA muscles to improve balance between CTP and TDP, " "to improve respiratory-phonatory coordination, and to decrease vocal strain when increasing vocal pitch.  Patient used 16-15-14-13-84-28-08-0-3-5-0-0-4-3-2-1 from B4 to C5 to B4with a sudden shift from CTP to TDP at 12  with maximal verbal, modeling, articulatory, phonatory, and resonatory cues.  The patient demonstrated improved ability to increase pitch in CTP without increasing vocal strain.    The following exercise was used in the past and is still a component of the patient's HEP:    The patient was instructed on the use of variegated pitch-vowel patterns in skilled vocal tasks to improve respiratory-phonatory coordination and to train vocal registration.  The patient completed 8-5-5-3-3-1 on /kev a i a i a/ from C3 to B4 (ceiling) to C3 with moderate verbal, resonatory, kinesthetic, and phonatory cues.  Nares occlusion was facilitating for decreased vocal strain.  Patient demonstrated vibrato on sustained pitches.    The patient was instructed in the context of professionally relevant generalization tasks (skilled vocal exercises with moderate pitch and loudness variation) with moderate verbal, modeling, and resonatory cues.  The patient sang \"About to Come Alive\" by Train with an approximate range of G3 to G4.  The patient remarked, \"It felt tighter, but it didn't hurt.  I think I was just protecting.\"     The patient was instructed on the use of alternating cricothyroid dominant voice production (CTP) and thyroarytenoid dominant voice production (TDP) to improve balance between vocal registers, to improve respiratory-phonatory coordination, and to increase comfortable vocal pitch range.  The patient completed 2-3-4-8-5-1-5 using alternating TDP and CTP on [a u a u a u a] from Ab3 to Bb4 to Db3 with minimal verbal, modeling, and resonatory cues.  The patient observed, \"What jumped out to me was that some of the mid to mid high falsetto was clearer and .\"    The patient was instructed on the use of " "variegated pitch-vowel patterns in skilled vocal tasks to improve respiratory-phonatory coordination, to train vocal registration, and to increase vocal pitch range.  The patient used 5-4-3-2-1-3-5-8-5-3-1 on [hE hE hE hE she dawit she] from C3 to C5 with moderate verbal, resonatory, and modeling cues.  The patient observed, \"I am right at the edge of getting this concept entrenched in my mind.\" And \"I don't feel any discomfort.\"    Instruction of Home Practice:  A revised home exercise plan (HEP) was collaboratively designed and instructed.  The patient confirmed that the HEP was reasonable and that it seemed attainable for daily practice.  Patient will use a previously recorded HEP to facilitate home practice.    ASSESSMENT/PLAN  Progress toward long-term goals:  Adequate but incomplete progress; please see above    Impressions:  IMPRESSIONS: Globus Sensation (R09.89) and Dysphonia (R49.0) in the context of Laryngeal Hyperfunction (J38.7)    Dakota had a productive session of therapy today, working on continuing to increase the complexity of his HEP with attention to generalization tasks. While he stated that he has experienced some difficulty returning to singing following his wedding, he note that he had a few weeks where he did not practice his HEP as much. Much of the work Dakota is completing in therapy revolves around vocal registration and balance between CTP and TDP and, as such,  we have discussed referral to a qualified singing  in the near future. He will continue to work on his exercises on a daily basis, and work on incorporating the techniques into his daily activities. Speech therapy continues to be medically necessary for Dakota to participate fully and engage in activities of daily living.     Plan:    I will see Dakota in 5 weeks (6/12/2025) during which time  we will plan to continue targeting balance between TDP and CTP, and coordination of respiration with phonation in increasingly complex " "vocationally relevant generalization tasks. We plan to speak with Penny Carvalho CCC-SLP at that time to transfer care if appropriate.    TOTAL SERVICE TIME: 74 minutes  TREATMENT (25950)    Travis Acevedo M.M., Ph.D., CF-SLP  Clinical Fellow in Voice and Upper Airway Speech-Language Pathology  [facility]  [email or web address]  Pronouns: he/him      OBJECTIVE FINDINGS  PATIENT REPORTED MEASURES  Patient Supplied Answers To Last 2 VHI Questionnaires      3/24/2025     8:50 AM 5/7/2025     2:45 PM   Voice Handicap Index (VHI-10)   My voice makes it difficult for people to hear me 1 1   People have difficulty understanding me in a noisy room 2 1   My voice difficulties restrict my personal and social life.  3 2   I feel left out of conversations because of my voice 1 2   My voice problem causes me to lose income 3 2   I feel as though I have to strain to produce voice 2 2   The clarity of my voice is unpredictable 2 2   My voice problem upsets me 2 2   My voice makes me feel handicapped 3 2   People ask, \"What's wrong with your voice?\" 1 1   VHI-10 20  17        Patient-reported          Patient Supplied Answers To Last 2 CSI Questionnaires      1/22/2025    12:28 PM 2/13/2025    11:01 AM   Cough Severity Index (CSI)   My cough is worse when I lie down 1 1   My coughing problem causes me to restrict my personal and social life 0 1   I tend to avoid places because of my cough problem 0 1   I feel embarrassed because of my coughing problem 0 1   People ask, ''What's wrong?'' because I cough a lot 0 0   I run out of air when I cough 0 0   My coughing problem affects my voice 2 2   My coughing problem limits my physical activity 0 0   My coughing problem upsets me 3 2   People ask me if I am sick because I cough a lot 0 0   CSI Score 6  8        Patient-reported          Patient Supplied Answers To Last 2 EAT Questionnaires      10/16/2024    12:57 PM 2/13/2025    11:01 AM   Eating " Assessment Tool (EAT-10)   My swallowing problem has caused me to lose weight 0  0   My swallowing problem interferes with my ability to go out for meals 0  0   Swallowing liquids takes extra effort 0  0   Swallowing solids takes extra effort 1  0   Swallowing pills takes extra effort 0  0   Swallowing is painful 1  0   The pleasure of eating is affected by my swallowing 1  0   When I swallow food sticks in my throat 1  2   I cough when I eat 0  1   Swallowing is stressful 1  1   EAT-10 5  4        Patient-reported    Data saved with a previous flowsheet row definition         Patient Supplied Answers to Dyspnea Index Questionnaire:       No data to display                 Attestation signed by Toney Sanchez, SLP at 5/8/2025  2:59 PM:  I attest that these services performed under my supervision, and I agree with the information contained in within this note.    Toney Sanchez M.M., M.A., CCC-SLP  Speech-Language Pathologist  Certificate of Vocology  [phone]      Again, thank you for allowing me to participate in the care of your patient.      Sincerely,    Travis Acevedo, SLP

## 2025-05-07 NOTE — PROGRESS NOTES
Dakota Corley is a 39 year old male who is being evaluated via a billable video visit.      Dakota has been notified and verbally consented to the following:   This video visit will be conducted between you and your provider.  Patient has opted to conduct today's video visit vs an in-person appointment.   Video visits are billed at different rates depending on your insurance coverage. Please reach out to your insurance provider with any questions.   If during the course of the call the provider feels the appointment is not appropriate, you will not be charged for this service.  Provider has received verbal consent for billable virtual visit from the patient? Yes  Will anyone else be joining your video visit? No    Call initiated at: [id number]  Type of Visit Platform Used: [employer] Video  Location of provider: Home  Location of patient: [facility]  PROGRESS REPORT (CPT 30449)    Patient: Dakota Corley  Date of Service: 5/7/2025  Date of Last Service: 3/24/2025  Number of Visits: 15  Certification Dates: not needed  Referring Physician: self-referred    Impressions from evaluation on 9/12/24 by Dolly Zacarias (voice), M.S., CCC-SLP:  Dakota Corley is presenting today with Globus Sensation (R09.89) and Dysphonia (R49.0) in the context of Laryngeal Hyperfunction (J38.7) and imbalance in the intrinsic and extrinsic laryngeal musculature. Perceptually, Dakota demonstrates mild-moderately rough and strained voice quality corresponding with elevated AVQI, lower than optimal habitual frequency, non optimal respiratory mechanics, significant L>R perilaryngeal tension, and visible tension of the neck/upper body. Laryngoscopy with stroboscopy was completed and reveals good symmetry and overall normal mucosal wave and amplitude bilaterally. 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 Based on today's evaluation, Dakota would benefit  from a course of speech therapy to improve laryngeal efficiency, reduce laryngeal irritation (mucus), improved respiratory mechanics and coordination with phonation, manual release strategies for perilaryngeal hyperfunction, and improve voice quality and balance of intrinsic/extrinsic laryngeal musculature. We will plan on 2 in person sessions to start, combined with virtual sessions. He [place] drive [place] the [place] border for the virtual sessions.    SUBJECTIVE:  Since Dakota's last session, he reports the following:   He reports that symptoms have improved in the interval since our last appointment.  He has noticed improved ability to sing higher pitches when he doesn't think about singing high.  He was able to sing in falsetto more easily lately.  He sang at his wedding for his wife recently.  He had a rehearsal last night that went long.  He feels discomfort inside his larynx today, possibly associated with increased reflux symptoms.  He notes that practicing his HEP has been going well. He just returned to practicing recently.  Primary patient goal: Assess current range and figure out what to improve upon next.    OBJECTIVE:  Patient Specific Goal Metrics:      3/5/2025    10:00 AM 3/24/2025     9:00 AM 5/7/2025     3:00 PM   Dysponia SLP Goals   How would you rate your speaking voice quality, if 0 is worst voice quality, and 10 is best voice? 8 6 7   How would you rate your singing voice quality, if 0 is worst voice quality, and 10 is best voice? 8 6 7   How how severe is your [Throat Discomfort - or use patient specific description], if 0 is no [discomfort] at all and 10 is the worst [discomfort]? 2 3 5   How much does your throat discomfort bother you?     A little bit Somewhat Somewhat       THERAPEUTIC ACTIVITIES  Today Dakota participated in the following therapeutic activities:  Counseling and Education:  Asked questions about the nature of his symptoms, and I answered all of these thoroughly.  Provided  "Pain Neuroscience Education     I provided Dakota with explanation and skilled instruction of:    All therapeutic exercises were completed chromatically and with a target accuracy of 80% unless otherwise noted.    The patient completed liptrills and labial-lingual trills to improve respiratory-phonatory coordination, to develop awareness of airflow during phonation, and to increase vocal range.  Patient used 8-9-2-5-4-3-2-1 on liptrills from C3 to E4 with minimal verbal, kinesthetic, and resonatory cues.  Nares occlusion was facilitating for decreased vocal strain.  Patient used 6-6-6-5-4-3-2-1 on labial lingual trills from F4 to C5 with minimal verbal, respiratory, and kinesthetic cues.  Nares occlusion was facilitating for decreased vocal strain.  Patient used 8-0-3-4-8-1-5-3-4-2-3-1-2-7-1 on labial lingual trills from C5 to F4 with minimal verbal and respiratory cues.  Patient used 8-8-6-7-2-2-5-3-4-2-3-1-2-7-1 on liptrills from E4 to C3 with minimal verbal and resonatory cues.  The patient noted, \"When I'm holding my nose, it was very very obvious [when it was forward or not].\"    The patient was instructed on the use of variegated pitch-vowel patterns in skilled vocal tasks to improve respiratory-phonatory coordination and to train vocal registration.  Patient used 8-0-9-5-4-3-2-1 on /dawit dawit dawit dawit she she she she/ from C3 to Ab4 to C3 with maximal verbal, resonatory, and articulatory cues.  Maximal inhalation through occluded lips was facilitating for decreased vocal strain.    The patient learned the use of alternating isometric contraction of the CT and TA muscles to improve balance between CTP and TDP, to improve respiratory-phonatory coordination, and to decrease vocal strain when increasing vocal pitch.  Patient used 16-15-14-24-75-00-72-0-1-9-2-5-4-3-2-1 from B4 to C5 to B4with a sudden shift from CTP to TDP at 12  with maximal verbal, modeling, articulatory, phonatory, and resonatory cues.  The patient " "demonstrated improved ability to increase pitch in CTP without increasing vocal strain.    The following exercise was used in the past and is still a component of the patient's HEP:    The patient was instructed on the use of variegated pitch-vowel patterns in skilled vocal tasks to improve respiratory-phonatory coordination and to train vocal registration.  The patient completed 8-5-5-3-3-1 on /kev a i a i a/ from C3 to B4 (ceiling) to C3 with moderate verbal, resonatory, kinesthetic, and phonatory cues.  Nares occlusion was facilitating for decreased vocal strain.  Patient demonstrated vibrato on sustained pitches.    The patient was instructed in the context of professionally relevant generalization tasks (skilled vocal exercises with moderate pitch and loudness variation) with moderate verbal, modeling, and resonatory cues.  The patient sang \"About to Come Alive\" by Train with an approximate range of G3 to G4.  The patient remarked, \"It felt tighter, but it didn't hurt.  I think I was just protecting.\"     The patient was instructed on the use of alternating cricothyroid dominant voice production (CTP) and thyroarytenoid dominant voice production (TDP) to improve balance between vocal registers, to improve respiratory-phonatory coordination, and to increase comfortable vocal pitch range.  The patient completed 4-5-2-8-5-1-5 using alternating TDP and CTP on [a u a u a u a] from Ab3 to Bb4 to Db3 with minimal verbal, modeling, and resonatory cues.  The patient observed, \"What jumped out to me was that some of the mid to mid high falsetto was clearer and .\"    The patient was instructed on the use of variegated pitch-vowel patterns in skilled vocal tasks to improve respiratory-phonatory coordination, to train vocal registration, and to increase vocal pitch range.  The patient used 5-4-3-2-1-3-5-8-5-3-1 on [hE hE hE hE she dawit she] from C3 to C5 with moderate verbal, resonatory, and modeling cues.  The " "patient observed, \"I am right at the edge of getting this concept entrenched in my mind.\" And \"I don't feel any discomfort.\"    Instruction of Home Practice:  A revised home exercise plan (HEP) was collaboratively designed and instructed.  The patient confirmed that the HEP was reasonable and that it seemed attainable for daily practice.  Patient will use a previously recorded HEP to facilitate home practice.    ASSESSMENT/PLAN  Progress toward long-term goals:  Adequate but incomplete progress; please see above    Impressions:  IMPRESSIONS: Globus Sensation (R09.89) and Dysphonia (R49.0) in the context of Laryngeal Hyperfunction (J38.7)    Dakota had a productive session of therapy today, working on continuing to increase the complexity of his HEP with attention to generalization tasks. While he stated that he has experienced some difficulty returning to singing following his wedding, he note that he had a few weeks where he did not practice his HEP as much. Much of the work Dakota is completing in therapy revolves around vocal registration and balance between CTP and TDP and, as such,  we have discussed referral to a qualified singing  in the near future. He will continue to work on his exercises on a daily basis, and work on incorporating the techniques into his daily activities. Speech therapy continues to be medically necessary for Dakota to participate fully and engage in activities of daily living.     Plan:    I will see Dakota in 5 weeks (6/12/2025) during which time  we will plan to continue targeting balance between TDP and CTP, and coordination of respiration with phonation in increasingly complex vocationally relevant generalization tasks. We plan to speak with Penny Carvalho CCC-SLP at that time to transfer care if appropriate.    TOTAL SERVICE TIME: 74 minutes  TREATMENT (32015)    Travis Acevedo M.M., Ph.D., CF-SLP  Clinical Fellow in Voice and Upper Airway Speech-Language Pathology  [facility] " "[facility] [email or web address]  Pronouns: he/him      OBJECTIVE FINDINGS  PATIENT REPORTED MEASURES  Patient Supplied Answers To Last 2 VHI Questionnaires      3/24/2025     8:50 AM 5/7/2025     2:45 PM   Voice Handicap Index (VHI-10)   My voice makes it difficult for people to hear me 1 1   People have difficulty understanding me in a noisy room 2 1   My voice difficulties restrict my personal and social life.  3 2   I feel left out of conversations because of my voice 1 2   My voice problem causes me to lose income 3 2   I feel as though I have to strain to produce voice 2 2   The clarity of my voice is unpredictable 2 2   My voice problem upsets me 2 2   My voice makes me feel handicapped 3 2   People ask, \"What's wrong with your voice?\" 1 1   VHI-10 20  17        Patient-reported          Patient Supplied Answers To Last 2 CSI Questionnaires      1/22/2025    12:28 PM 2/13/2025    11:01 AM   Cough Severity Index (CSI)   My cough is worse when I lie down 1 1   My coughing problem causes me to restrict my personal and social life 0 1   I tend to avoid places because of my cough problem 0 1   I feel embarrassed because of my coughing problem 0 1   People ask, ''What's wrong?'' because I cough a lot 0 0   I run out of air when I cough 0 0   My coughing problem affects my voice 2 2   My coughing problem limits my physical activity 0 0   My coughing problem upsets me 3 2   People ask me if I am sick because I cough a lot 0 0   CSI Score 6  8        Patient-reported          Patient Supplied Answers To Last 2 EAT Questionnaires      10/16/2024    12:57 PM 2/13/2025    11:01 AM   Eating Assessment Tool (EAT-10)   My swallowing problem has caused me to lose weight 0  0   My swallowing problem interferes with my ability to go out for meals 0  0   Swallowing liquids takes extra effort 0  0   Swallowing solids takes extra effort 1  0   Swallowing pills takes extra effort 0  0   Swallowing " is painful 1  0   The pleasure of eating is affected by my swallowing 1  0   When I swallow food sticks in my throat 1  2   I cough when I eat 0  1   Swallowing is stressful 1  1   EAT-10 5  4        Patient-reported    Data saved with a previous flowsheet row definition         Patient Supplied Answers to Dyspnea Index Questionnaire:       No data to display

## 2025-06-12 ENCOUNTER — VIRTUAL VISIT (OUTPATIENT)
Dept: OTOLARYNGOLOGY | Facility: CLINIC | Age: 40
End: 2025-06-12
Payer: COMMERCIAL

## 2025-06-12 DIAGNOSIS — R49.0 DYSPHONIA: Primary | ICD-10-CM

## 2025-06-12 DIAGNOSIS — J38.7 LARYNGEAL HYPERFUNCTION: ICD-10-CM

## 2025-06-12 DIAGNOSIS — R09.A2 GLOBUS SENSATION: ICD-10-CM

## 2025-06-12 NOTE — LETTER
6/12/2025       RE: Dakota Corley  [address] [place] [address]     Dear Colleague,    Thank you for referring your patient, Dakota Corley, to the [facility] [place] at [facility]. Please see a copy of my visit note below.    My colleague, Penny Carvalho, CCC-SLP, and I used this time to coordinate care with Mr. Corley given that his care will be transferred to Penny this summer. No skilled services were provided and, as such, there will be no charge for this encounter.    Travis Acevedo M.M., Ph.D., CF-SLP  Clinical Fellow in Voice and Upper Airway Speech-Language Pathology  [facility]  [email or web address]  Pronouns: he/him      Again, thank you for allowing me to participate in the care of your patient.      Sincerely,    Travis Acevedo, SLP

## 2025-06-12 NOTE — PROGRESS NOTES
My colleague, Penny Carvalho, CCC-SLP, and I used this time to coordinate care with Mr. Corley given that his care will be transferred to Penny this summer. No skilled services were provided and, as such, there will be no charge for this encounter.    Travis Acevedo M.M., Ph.D., CF-SLP  Clinical Fellow in Voice and Upper Airway Speech-Language Pathology  [facility]  [email or web address]  Pronouns: he/him

## 2025-06-25 ENCOUNTER — TELEPHONE (OUTPATIENT)
Dept: OTOLARYNGOLOGY | Facility: CLINIC | Age: 40
End: 2025-06-25

## 2025-07-02 ENCOUNTER — VIRTUAL VISIT (OUTPATIENT)
Dept: OTOLARYNGOLOGY | Facility: CLINIC | Age: 40
End: 2025-07-02
Payer: COMMERCIAL

## 2025-07-02 DIAGNOSIS — R09.A2 GLOBUS SENSATION: ICD-10-CM

## 2025-07-02 DIAGNOSIS — J38.7 LARYNGEAL HYPERFUNCTION: ICD-10-CM

## 2025-07-02 DIAGNOSIS — R49.0 DYSPHONIA: Primary | ICD-10-CM

## 2025-07-02 NOTE — PROGRESS NOTES
Dakota Corley is a 39 year old male who is being evaluated via a billable video visit.      Dakota has been notified and verbally consented to the following:   This video visit will be conducted between you and your provider.  Patient has opted to conduct today's video visit vs an in-person appointment.   Video visits are billed at different rates depending on your insurance coverage. Please reach out to your insurance provider with any questions.   If during the course of the call the provider feels the appointment is not appropriate, you will not be charged for this service.  Provider has received verbal consent for billable virtual visit from the patient? Yes  Will anyone else be joining your video visit? No    Call initiated at: 0900   Type of Visit Platform Used: [employer] Video  Location of provider: Home  Location of patient: [place] border    [facility]  PROGRESS REPORT (CPT 59332)    Patient: Dakota Corley  Date of Service: 7/2/2025  Date of Last Service: 5/7/25  Number of Visits: 16  Certification Dates: not needed  Referring Physician: self-referred    Impressions from evaluation on 9/12/24 by Dolly Zacarias (voice), M.S., CCC-SLP:  Dakota Corley is presenting today with Globus Sensation (R09.89) and Dysphonia (R49.0) in the context of Laryngeal Hyperfunction (J38.7) and imbalance in the intrinsic and extrinsic laryngeal musculature. Perceptually, Dakota demonstrates mild-moderately rough and strained voice quality corresponding with elevated AVQI, lower than optimal habitual frequency, non optimal respiratory mechanics, significant L>R perilaryngeal tension, and visible tension of the neck/upper body. Laryngoscopy with stroboscopy was completed and reveals good symmetry and overall normal mucosal wave and amplitude bilaterally. 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 Based on today's  "evaluation, Dakota would benefit from a course of speech therapy to improve laryngeal efficiency, reduce laryngeal irritation (mucus), improved respiratory mechanics and coordination with phonation, manual release strategies for perilaryngeal hyperfunction, and improve voice quality and balance of intrinsic/extrinsic laryngeal musculature. We will plan on 2 in person sessions to start, combined with virtual sessions. He agreed to drive [place] the [place] border for the virtual sessions.     SUBJECTIVE:  Since Dakota's last session, he reports the following:   He's continued doing his muscle release strategies and getting massage, but everything is just a constant maintenance without reducing the need for strategies. Some days the tension is not so bad and other days it's really terrible no matter what he does.   He's switching to a new primary care provider and has been finding that some of his hormone and chemical levels are abnormal. He's looking into systemic factors which may relate to his muscle tension.   On the days when his muscle tension is \"terrible\" he tries to do an exercise band release on his traps, stretches, mobilization tasks, etc and it's sometimes helpful.   He is still struggling with muscle tension and accessing his upper register. He finds the visual feedback with his guitar to be really helpful, but obviously can't have a visual feedback with his voice.     OBJECTIVE:  Patient Specific Goal Metrics:      3/24/2025     9:00 AM 5/7/2025     3:00 PM 7/2/2025     9:00 AM   Dysponia SLP Goals   How would you rate your speaking voice quality, if 0 is worst voice quality, and 10 is best voice? 6 7 5   How would you rate your singing voice quality, if 0 is worst voice quality, and 10 is best voice? 6 7 6   How how severe is your [Throat Discomfort - or use patient specific description], if 0 is no [discomfort] at all and 10 is the worst [discomfort]? 3 5 7   How much does your throat discomfort bother you?     " "Somewhat Somewhat Quite a bit     THERAPEUTIC ACTIVITIES  Today Dakota participated in the following therapeutic activities:  Counseling and Education:  Asked  questions about the nature of his symptoms, and I answered all of these thoroughly.  Discussion of tongue ties and how this may relate to his muscle tension.     I provided Dakota with explanation and skilled instruction of:  Laryngeal release technique targeting reduced laryngeal elevation and constriction with phonation throughout vocal range was instructed:  Patient was instructed in a vowel modification targeting more neutral laryngeal and pharyngeal positioning, demonstrating a tendency for a more \"shallow\" vowel with laryngeal elevation.   He endorsed feeling less tight and strained with use of this strategy, progressing from approximately 92 to 494Hz.  He demonstrated good accuracy following moderate clinician support. Ongoing support and reinforcement will be needed.   Instruction of Home Practice:  A revised regimen for home practice was instructed.  I provided an AVS of important notes of today's therapeutic activities to facilitate practice.    ASSESSMENT/PLAN  Progress toward long-term goals:  Adequate but incomplete progress; please see above    Impressions:  IMPRESSIONS: Globus Sensation (R09.89) and Dysphonia (R49.0) in the context of Laryngeal Hyperfunction (J38.7)    Dakota had a productive session of therapy today, working on phonatory tasks targeting release of laryngeal tension.  He will continue to work on his exercises on a daily basis, and work on incorporating the techniques into his daily activities. Speech therapy continues to be medically necessary for Dakota to participate fully and engage in activities of daily living.     Plan:   I will see Dakota in 1 weeks and will plan to review the laryngeal release \"buh buh\", instruct the yodel and the upper extension release. Resume targeting forward resonance in speaking voice, adding a target " "pitch.   For home practice goals, see AVS.     TOTAL SERVICE TIME: 60 minutes  TREATMENT (10345)    Dolly Zacarias (voice), M.S., CCC-SLP  Speech-Language Pathologist  [facility]  [phone]  [email or web address]  Pronouns: she/her/hers      *this report was created in part through the use of computerized dictation software, and though reviewed following completion, some typographic errors may persist.  If there is confusion regarding any of this notes contents, please contact me for clarification    OBJECTIVE FINDINGS  PATIENT REPORTED MEASURES  Patient Supplied Answers To Last 2 VHI Questionnaires      6/12/2025     3:45 PM 7/2/2025     8:14 AM   Voice Handicap Index (VHI-10)   My voice makes it difficult for people to hear me 1 2   People have difficulty understanding me in a noisy room 2 2   My voice difficulties restrict my personal and social life.  2 2   I feel left out of conversations because of my voice 1 1   My voice problem causes me to lose income 3 3   I feel as though I have to strain to produce voice 2 3   The clarity of my voice is unpredictable 2 3   My voice problem upsets me 3 3   My voice makes me feel handicapped 3 3   People ask, \"What's wrong with your voice?\" 1 1   VHI-10 20  23        Patient-reported          Patient Supplied Answers To Last 2 CSI Questionnaires      1/22/2025    12:28 PM 2/13/2025    11:01 AM   Cough Severity Index (CSI)   My cough is worse when I lie down 1 1   My coughing problem causes me to restrict my personal and social life 0 1   I tend to avoid places because of my cough problem 0 1   I feel embarrassed because of my coughing problem 0 1   People ask, ''What's wrong?'' because I cough a lot 0 0   I run out of air when I cough 0 0   My coughing problem affects my voice 2 2   My coughing problem limits my physical activity 0 0   My coughing problem upsets me 3 2   People ask me if I am sick because I cough a lot 0 0   CSI Score 6  8        " Patient-reported          Patient Supplied Answers To Last 2 EAT Questionnaires      10/16/2024    12:57 PM 2/13/2025    11:01 AM   Eating Assessment Tool (EAT-10)   My swallowing problem has caused me to lose weight 0  0   My swallowing problem interferes with my ability to go out for meals 0  0   Swallowing liquids takes extra effort 0  0   Swallowing solids takes extra effort 1  0   Swallowing pills takes extra effort 0  0   Swallowing is painful 1  0   The pleasure of eating is affected by my swallowing 1  0   When I swallow food sticks in my throat 1  2   I cough when I eat 0  1   Swallowing is stressful 1  1   EAT-10 5  4        Patient-reported    Data saved with a previous flowsheet row definition         Patient Supplied Answers to Dyspnea Index Questionnaire:       No data to display

## 2025-07-02 NOTE — LETTER
7/2/2025       RE: Dakota Corley  [address] [facility] [address]     Dear Colleague,    Thank you for referring your patient, Dakota Corley, to the [facility] [place] at [facility]. Please see a copy of my visit note below.    Dakota Corley is a 39 year old male who is being evaluated via a billable video visit.      Dakota has been notified and verbally consented to the following:   This video visit will be conducted between you and your provider.  Patient has opted to conduct today's video visit vs an in-person appointment.   Video visits are billed at different rates depending on your insurance coverage. Please reach out to your insurance provider with any questions.   If during the course of the call the provider feels the appointment is not appropriate, you will not be charged for this service.  Provider has received verbal consent for billable virtual visit from the patient? Yes  Will anyone else be joining your video visit? No    Call initiated at: 0900   Type of Visit Platform Used: [employer] Video  Location of provider: Home  Location of patient: [place]    [facility]  PROGRESS REPORT (CPT 87109)    Patient: Dakota Corley  Date of Service: 7/2/2025  Date of Last Service: 5/7/25  Number of Visits: 16  Certification Dates: not needed  Referring Physician: self-referred    Impressions from evaluation on 9/12/24 by Dolly Zacarias (voice), M.S., CCC-SLP:  Dakota Corley is presenting today with Globus Sensation (R09.89) and Dysphonia (R49.0) in the context of Laryngeal Hyperfunction (J38.7) and imbalance in the intrinsic and extrinsic laryngeal musculature. Perceptually, Dakota demonstrates mild-moderately rough and strained voice quality corresponding with elevated AVQI, lower than optimal habitual frequency, non optimal respiratory mechanics, significant L>R perilaryngeal tension, and visible tension of  "the neck/upper body. Laryngoscopy with stroboscopy was completed and reveals good symmetry and overall normal mucosal wave and amplitude bilaterally. 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 Based on today's evaluation, Dakota would benefit from a course of speech therapy to improve laryngeal efficiency, reduce laryngeal irritation (mucus), improved respiratory mechanics and coordination with phonation, manual release strategies for perilaryngeal hyperfunction, and improve voice quality and balance of intrinsic/extrinsic laryngeal musculature. We will plan on 2 in person sessions to start, combined with virtual sessions. He agreed to drive across the [place] border for the virtual sessions.     SUBJECTIVE:  Since Dakota's last session, he reports the following:   He's continued doing his muscle release strategies and getting massage, but everything is just a constant maintenance without reducing the need for strategies. Some days the tension is not so bad and other days it's really terrible no matter what he does.   He's switching to a new primary care provider and has been finding that some of his hormone and chemical levels are abnormal. He's looking into systemic factors which may relate to his muscle tension.   On the days when his muscle tension is \"terrible\" he tries to do an exercise band release on his traps, stretches, mobilization tasks, etc and it's sometimes helpful.   He is still struggling with muscle tension and accessing his upper register. He finds the visual feedback with his guitar to be really helpful, but obviously can't have a visual feedback with his voice.     OBJECTIVE:  Patient Specific Goal Metrics:      3/24/2025     9:00 AM 5/7/2025     3:00 PM 7/2/2025     9:00 AM   Dysponia SLP Goals   How would you rate your speaking voice quality, if 0 is worst voice quality, and 10 is best voice? 6 7 5   How would " "you rate your singing voice quality, if 0 is worst voice quality, and 10 is best voice? 6 7 6   How how severe is your [Throat Discomfort - or use patient specific description], if 0 is no [discomfort] at all and 10 is the worst [discomfort]? 3 5 7   How much does your throat discomfort bother you?     Somewhat Somewhat Quite a bit     THERAPEUTIC ACTIVITIES  Today Dakota participated in the following therapeutic activities:  Counseling and Education:  Asked  questions about the nature of his symptoms, and I answered all of these thoroughly.  Discussion of tongue ties and how this may relate to his muscle tension.     I provided Dakota with explanation and skilled instruction of:  Laryngeal release technique targeting reduced laryngeal elevation and constriction with phonation throughout vocal range was instructed:  Patient was instructed in a vowel modification targeting more neutral laryngeal and pharyngeal positioning, demonstrating a tendency for a more \"shallow\" vowel with laryngeal elevation.   He endorsed feeling less tight and strained with use of this strategy, progressing from approximately 92 to 494Hz.  He demonstrated good accuracy following moderate clinician support. Ongoing support and reinforcement will be needed.   Instruction of Home Practice:  A revised regimen for home practice was instructed.  I provided an AVS of important notes of today's therapeutic activities to facilitate practice.    ASSESSMENT/PLAN  Progress toward long-term goals:  Adequate but incomplete progress; please see above    Impressions:  IMPRESSIONS: Globus Sensation (R09.89) and Dysphonia (R49.0) in the context of Laryngeal Hyperfunction (J38.7)    Dakota had a productive session of therapy today, working on phonatory tasks targeting release of laryngeal tension.  He will continue to work on his exercises on a daily basis, and work on incorporating the techniques into his daily activities. Speech therapy continues to be medically " "necessary for Dakota to participate fully and engage in activities of daily living.     Plan:   I will see Dakota in 1 weeks and will plan to review the laryngeal release \"buh buh\", instruct the yodel and the upper extension release. Resume targeting forward resonance in speaking voice, adding a target pitch.   For home practice goals, see AVS.     TOTAL SERVICE TIME: 60 minutes  TREATMENT (75814)    Dolly Zacarias (voice), M.S., CCC-SLP  Speech-Language Pathologist  [facility]  [phone]  [email or web address]  Pronouns: she/her/hers      *this report was created in part through the use of computerized dictation software, and though reviewed following completion, some typographic errors may persist.  If there is confusion regarding any of this notes contents, please contact me for clarification    OBJECTIVE FINDINGS  PATIENT REPORTED MEASURES  Patient Supplied Answers To Last 2 VHI Questionnaires      6/12/2025     3:45 PM 7/2/2025     8:14 AM   Voice Handicap Index (VHI-10)   My voice makes it difficult for people to hear me 1 2   People have difficulty understanding me in a noisy room 2 2   My voice difficulties restrict my personal and social life.  2 2   I feel left out of conversations because of my voice 1 1   My voice problem causes me to lose income 3 3   I feel as though I have to strain to produce voice 2 3   The clarity of my voice is unpredictable 2 3   My voice problem upsets me 3 3   My voice makes me feel handicapped 3 3   People ask, \"What's wrong with your voice?\" 1 1   VHI-10 20  23        Patient-reported          Patient Supplied Answers To Last 2 CSI Questionnaires      1/22/2025    12:28 PM 2/13/2025    11:01 AM   Cough Severity Index (CSI)   My cough is worse when I lie down 1 1   My coughing problem causes me to restrict my personal and social life 0 1   I tend to avoid places because of my cough problem 0 1   I feel embarrassed because of my coughing problem 0 1   People " ask, ''What's wrong?'' because I cough a lot 0 0   I run out of air when I cough 0 0   My coughing problem affects my voice 2 2   My coughing problem limits my physical activity 0 0   My coughing problem upsets me 3 2   People ask me if I am sick because I cough a lot 0 0   CSI Score 6  8        Patient-reported          Patient Supplied Answers To Last 2 EAT Questionnaires      10/16/2024    12:57 PM 2/13/2025    11:01 AM   Eating Assessment Tool (EAT-10)   My swallowing problem has caused me to lose weight 0  0   My swallowing problem interferes with my ability to go out for meals 0  0   Swallowing liquids takes extra effort 0  0   Swallowing solids takes extra effort 1  0   Swallowing pills takes extra effort 0  0   Swallowing is painful 1  0   The pleasure of eating is affected by my swallowing 1  0   When I swallow food sticks in my throat 1  2   I cough when I eat 0  1   Swallowing is stressful 1  1   EAT-10 5  4        Patient-reported    Data saved with a previous flowsheet row definition         Patient Supplied Answers to Dyspnea Index Questionnaire:       No data to display                 Again, thank you for allowing me to participate in the care of your patient.      Sincerely,    Penny Carvalho, SLP

## 2025-07-02 NOTE — PATIENT INSTRUCTIONS
"Hello!    It was a pleasure to see you today. Please complete the exercises below and remember, a few minutes of practice many times throughout the day is more important than one large practice session. If you have any questions about your strategies, feel free to contact me at [email or web address] or via [email or web address]. Please call [phone] for scheduling alterations or to be seen sooner in case of cancellations.     Home Exercise Program:    SINGING EXERCISES  Laryngeal Release:  1. [id number] \"buh-buh buh-buh buh-buh...\" like \"hug\"  Let your lips and cheeks be very slack  They should almost puff and pop  Embrace the blowfish face  You can do a \"baby gorilla\", a big cluck, or a cluck bridge to \"set\" the body before each task.  Keep aiming for the deep \"uh\" rather than the \"santos santos\" black sheep.   You can bounce on each down beat to feel the release  Don't forget about the laryngeal release inhalation    Ian's Exercise  Keep using your previously recorded exercises, but for the falsetto to full voice exercise keep the following in mind:     -The exercise is two full octaves starting on B4 (B above middle C).    -Start on /shoo/ and in pure falsetto.  If you have trouble singing in falsetto, be sure to round your lips like you are blowing out a candle (rather than keeping your lips horizontally spread). You can use your finger tips to hollow your cheeks.  -Continue down the scale on /shoo/ in falsetto until the fifth note, where you should switch to full voice. You can mix a bit of \"uh\" like \"hug\" or \"bug\" into the \"shee\" vowel to help keep it more open.    -Continue down all the way to B2.  -If it still feels tight, you can start with a Shooooo siren, then come in on the first note. You can also spend a little more time on the \"shhh\" portion to get air flow going.      Then, sing the same scale but starting on C5.  Then, come back down to B4.     That's it!  Try this out for 1-2 weeks and see if falsetto " becomes smoother and easier.    Thank you and have a great day!  Avis Evans. (voice), M.S., CCC-SLP  Speech-Language Pathologist  [facility]  [phone]  [email or web address]  Pronouns: she/her/hers

## 2025-08-05 ENCOUNTER — TELEPHONE (OUTPATIENT)
Dept: OTOLARYNGOLOGY | Facility: CLINIC | Age: 40
End: 2025-08-05
Payer: COMMERCIAL

## 2025-08-10 ENCOUNTER — HEALTH MAINTENANCE LETTER (OUTPATIENT)
Age: 40
End: 2025-08-10